# Patient Record
Sex: FEMALE | Race: WHITE | Employment: FULL TIME | URBAN - METROPOLITAN AREA
[De-identification: names, ages, dates, MRNs, and addresses within clinical notes are randomized per-mention and may not be internally consistent; named-entity substitution may affect disease eponyms.]

---

## 2017-05-11 ENCOUNTER — CARE COORDINATION (OUTPATIENT)
Dept: CASE MANAGEMENT | Facility: CLINIC | Age: 32
End: 2017-05-11

## 2018-05-16 ENCOUNTER — OFFICE VISIT (OUTPATIENT)
Dept: FAMILY MEDICINE | Facility: CLINIC | Age: 33
End: 2018-05-16
Payer: COMMERCIAL

## 2018-05-16 VITALS
TEMPERATURE: 98.4 F | HEIGHT: 62 IN | OXYGEN SATURATION: 98 % | SYSTOLIC BLOOD PRESSURE: 102 MMHG | BODY MASS INDEX: 20.24 KG/M2 | RESPIRATION RATE: 16 BRPM | WEIGHT: 110 LBS | HEART RATE: 89 BPM | DIASTOLIC BLOOD PRESSURE: 66 MMHG

## 2018-05-16 DIAGNOSIS — B07.8 COMMON WART: ICD-10-CM

## 2018-05-16 DIAGNOSIS — R21 RASH: Primary | ICD-10-CM

## 2018-05-16 DIAGNOSIS — L91.8 SKIN TAG: ICD-10-CM

## 2018-05-16 PROCEDURE — 17110 DESTRUCTION B9 LES UP TO 14: CPT | Performed by: NURSE PRACTITIONER

## 2018-05-16 PROCEDURE — 99202 OFFICE O/P NEW SF 15 MIN: CPT | Mod: 25 | Performed by: NURSE PRACTITIONER

## 2018-05-16 NOTE — PATIENT INSTRUCTIONS
Try hydrocortisone cream and clotrimazole to the rash     Follow up with derm with no improvement

## 2018-05-16 NOTE — MR AVS SNAPSHOT
After Visit Summary   5/16/2018    Cintia Randhawa    MRN: [id number]           Patient Information     Date Of Birth          1985        Visit Information        Provider Department      5/16/2018 8:30 AM Amaya Ludwig APRN CNP [facility]        Today's Diagnoses     Rash    -  1    Common wart        Skin tag          Care Instructions    Try hydrocortisone cream and clotrimazole to the rash     Follow up with derm with no improvement             Follow-ups after your visit        Additional Services     DERMATOLOGY REFERRAL       Your provider has referred you to:   FMG: [facility] [phone]   [email or web address]    FHN: [facility] [phone]   [email or web address]      Please be aware that coverage of these services is subject to the terms and limitations of your health insurance plan.  Call member services at your health plan with any benefit or coverage questions.      Please bring the following with you to your appointment:    (1) Any X-Rays, CTs or MRIs which have been performed.  Contact the facility where they were done to arrange for  prior to your scheduled appointment.    (2) List of current medications  (3) This referral request   (4) Any documents/labs given to you for this referral                  Who to contact     If you have questions or need follow up information about today's clinic visit or your schedule please contact [facility] directly at [phone].  Normal or non-critical lab and imaging results will be communicated to you by MyChart, letter or phone within 4 business days after the clinic has received the results. If you do not hear from us within 7 days, please contact the clinic through MyChart or phone. If you have a critical or abnormal lab result, we will notify you by phone as soon as possible.  Submit refill  "requests through APR or call your pharmacy and they will forward the refill request to us. Please allow 3 business days for your refill to be completed.          Additional Information About Your Visit        Diagnostic PhotonicsharInstant Opinion Information     APR lets you send messages to your doctor, view your test results, renew your prescriptions, schedule appointments and more. To sign up, go to [email or web address] . Click on \"Log in\" on the left side of the screen, which will take you to the Welcome page. Then click on \"Sign up Now\" on the right side of the page.     You will be asked to enter the access code listed below, as well as some personal information. Please follow the directions to create your username and password.     Your access code is: [email or web address]  Expires: 2018  8:54 AM     Your access code will  in 90 days. If you need help or a new code, please call your [place] clinic or [phone].        Care EveryWhere ID     This is your Care EveryWhere ID. This could be used by other organizations to access your [place] medical records  [id number]        Your Vitals Were     Pulse Temperature Respirations Height Pulse Oximetry Breastfeeding?    89 98.4  F (36.9  C) (Tympanic) 16 5' 2\" (1.575 m) 98% Yes    BMI (Body Mass Index)                   20.12 kg/m2            Blood Pressure from Last 3 Encounters:   18 102/66    Weight from Last 3 Encounters:   18 110 lb (49.9 kg)              We Performed the Following     DERMATOLOGY REFERRAL        Primary Care Provider Office Phone # Fax #    Carlie Mirza [phone]       [facility] [address]  [place] [address]        Equal Access to Services     [facility] Center[facility] : Skylar Vee, isreal garcia, minal burrowsalmalorie garcia, griselda campbell. So [place] -[phone].    ATENCIÓN: Si habla español, tiene a owen disposición servicios gratuitos de asistencia " lingüísticaAnnie Leiva al [phone].    We comply with applicable federal civil rights laws and [place] laws. We do not discriminate on the basis of race, color, national origin, age, disability, sex, sexual orientation, or gender identity.            Thank you!     Thank you for choosing [facility]  for your care. Our goal is always to provide you with excellent care. Hearing back from our patients is one way we can continue to improve our services. Please take a few minutes to complete the written survey that you may receive in the mail after your visit with us. Thank you!             Your Updated Medication List - Protect others around you: Learn how to safely use, store and throw away your medicines at www.disposemymeds.org.      Notice  As of 5/16/2018  8:54 AM    You have not been prescribed any medications.

## 2018-05-16 NOTE — PROGRESS NOTES
"HPI      SUBJECTIVE:   Cintia Randhawa is a 32 year old female who presents to clinic today for the following health issues:      Rash      Duration: 2 weeks    Description  Location: Both [place]  Itching: moderate    Intensity:  moderate    Accompanying signs and symptoms: Itching; Burning; Painful; Possible drainage; Not Spreading    History (similar episodes/previous evaluation): intermittent 1.5 month-Lasted 2 weeks; Returned 2 weeks ago    Precipitating or alleviating factors:  New exposures:  clothes detergant All natural; New Dedorant  Recent travel: no      Therapies tried and outcome: Aquaphor; Breast Milk    Rash of both axilla 2 months ago. Thought it was from a new natural deodorant she used    Used aquaphor and got it under control. Rash flared up again 2-3 weeks ago   Itches, burns, hurts   It was slightly damp the other day   Breast milk calmed it down a little last night     PMH:  Brain surgery 1 year ago. 7cm meningioma while she was 5 months pregnant. Son is now 8 months and she is breastfeeding   Neurologist in [place]   See OB/GYN Dr Rogel[facility]  Only on [facility]     Past Medical History:   Diagnosis Date     Meningioma (H) 2017    7cm when she was 5 months pregnant     History reviewed. No pertinent family history.  History reviewed. No pertinent surgical history.  Social History   Substance Use Topics     Smoking status: Never Smoker     Smokeless tobacco: Never Used     Alcohol use No     No current outpatient prescriptions on file.     Allergies   Allergen Reactions     Erythromycin      Sulfa Drugs GI Disturbance     Fever, chills aches, vommitting       Reviewed and updated as needed this visit by clinical staff and provider      ROS  Detailed as above       /66 (BP Location: Right arm, Patient Position: Chair, Cuff Size: Adult Regular)  Pulse 89  Temp 98.4  F (36.9  C) (Tympanic)  Resp 16  Ht 5' 2\" (1.575 m)  Wt 110 lb (49.9 kg)  SpO2 98%  " Breastfeeding? Yes  BMI 20.12 kg/m2    Physical Exam   Constitutional: She is well-developed, well-nourished, and in no distress.   Neurological: She is alert.   Skin: Skin is warm and dry. Rash noted.   Wart right 3rd finger and left knee   Skin tag left axilla   Rough erythematous rash about 6 cm in diameter of bilateral axilla   Psychiatric: Mood and affect normal.   Vitals reviewed.      Used liquid nitrogen to treat 2 benign appearing warts on right 3rd finger and left knee and also skin tag of left axilla after explaining risks and benefits to patient.  Three liquid nitrogen applications of 15-20 seconds each were performed on each lesion.  Pt tolerated procedure well.  There were no complications.      Assessment and Plan:       ICD-10-CM    1. Rash R21 DERMATOLOGY REFERRAL   2. Common wart B07.8 DERMATOLOGY REFERRAL     DESTRUCT BENIGN LESION, UP TO 14   3. Skin tag L91.8 DESTRUCT BENIGN LESION, UP TO 14       New rash of bilateral axilla. Unknown etiology as it looks like eczema and intertrigo. Will treat with hydrocortisone and clotrimazole for 5 days. If no improvement then f/u with derm.   Used liquid nitrogen per above for 2 warts and 1 skin tag. Gave home instructions to start OTC wart treatment in 2 days. F/u in a couple weeks without resolution       RANJANA Rodriguez, CNP  [facility]

## 2018-06-15 ENCOUNTER — OFFICE VISIT (OUTPATIENT)
Dept: FAMILY MEDICINE | Facility: CLINIC | Age: 33
End: 2018-06-15

## 2018-06-15 VITALS
OXYGEN SATURATION: 99 % | DIASTOLIC BLOOD PRESSURE: 64 MMHG | SYSTOLIC BLOOD PRESSURE: 98 MMHG | RESPIRATION RATE: 16 BRPM | BODY MASS INDEX: 19.97 KG/M2 | HEART RATE: 68 BPM | WEIGHT: 109.2 LBS

## 2018-06-15 DIAGNOSIS — Z87.898 HISTORY OF BRAIN TUMOR: ICD-10-CM

## 2018-06-15 DIAGNOSIS — Z86.011 HX OF MENINGIOMA OF THE BRAIN: ICD-10-CM

## 2018-06-15 DIAGNOSIS — Z83.49 FAMILY HISTORY OF THYROID DISEASE: ICD-10-CM

## 2018-06-15 DIAGNOSIS — Z78.9 GLUTEN FREE DIET: ICD-10-CM

## 2018-06-15 DIAGNOSIS — R53.83 OTHER FATIGUE: Primary | ICD-10-CM

## 2018-06-15 LAB
% GRANULOCYTES: 59.6 % (ref 42.2–75.2)
BACTERIA URINE: NORMAL
BILIRUB UR QL STRIP: 0
BLOOD URINE DIP: 0
CASTS/LPF: NORMAL
COLOR UR: YELLOW
CRYSTAL URINE: NORMAL
EPITHELIAL CELLS - QUEST: NORMAL
GLUCOSE UR STRIP-MCNC: 0 MG/DL
HCT VFR BLD AUTO: 41.6 % (ref 35–46)
HEMOGLOBIN: 13.9 G/DL (ref 11.8–15.5)
KETONES UR QL STRIP: 0
LEUKOCYTE ESTERASE URINE DIP: 0
LYMPHOCYTES NFR BLD AUTO: 32 % (ref 20.5–51.1)
MCH RBC QN AUTO: 29.4 PG (ref 27–31)
MCHC RBC AUTO-ENTMCNC: 33.4 G/DL (ref 33–37)
MCV RBC AUTO: 88 FL (ref 80–100)
MONOCYTES NFR BLD AUTO: 8.4 % (ref 1.7–9.3)
MUCOUS URINE: NORMAL
NITRITE UR QL STRIP: NORMAL
PH UR STRIP: 6.5 PH (ref 5–9)
PLATELET # BLD AUTO: 198 K/UL (ref 140–450)
PROT UR QL: 0 MG/DL (ref ?–0.01)
RBC # BLD AUTO: 4.73 X10/CMM (ref 3.7–5.2)
RBC URINE: NORMAL (ref 0–3)
SP GR UR STRIP: 1.02 (ref 1–1.02)
UROBILINOGEN UR QL STRIP: 0.2 EU/DL (ref 0.2–1)
WBC # BLD AUTO: 5.2 X10/CMM (ref 3.8–11)
WBC URINE: NORMAL (ref 0–3)

## 2018-06-15 PROCEDURE — 86140 C-REACTIVE PROTEIN: CPT | Mod: 90 | Performed by: FAMILY MEDICINE

## 2018-06-15 PROCEDURE — 82728 ASSAY OF FERRITIN: CPT | Mod: 90 | Performed by: FAMILY MEDICINE

## 2018-06-15 PROCEDURE — 80050 GENERAL HEALTH PANEL: CPT | Mod: 90 | Performed by: FAMILY MEDICINE

## 2018-06-15 PROCEDURE — 83540 ASSAY OF IRON: CPT | Mod: 90 | Performed by: FAMILY MEDICINE

## 2018-06-15 PROCEDURE — 85651 RBC SED RATE NONAUTOMATED: CPT | Performed by: FAMILY MEDICINE

## 2018-06-15 PROCEDURE — 99203 OFFICE O/P NEW LOW 30 MIN: CPT | Performed by: FAMILY MEDICINE

## 2018-06-15 PROCEDURE — 36415 COLL VENOUS BLD VENIPUNCTURE: CPT | Performed by: FAMILY MEDICINE

## 2018-06-15 PROCEDURE — 81003 URINALYSIS AUTO W/O SCOPE: CPT | Performed by: FAMILY MEDICINE

## 2018-06-15 PROCEDURE — 83550 IRON BINDING TEST: CPT | Mod: 90 | Performed by: FAMILY MEDICINE

## 2018-06-15 NOTE — PROGRESS NOTES
Problem(s) Oriented visit      SUBJECTIVE:                                                    Cintia Randhawa is a 32 year old female who presents to clinic today for:  Patient presents with:  Dizziness: Extremely tired, Cloudy & Foggy feeling     Pt is new to [facility] and me today. Obtained and reviewed her outside medical records during her visit today.     Has not felt up to her usual self recently. She is tired, having difficulty concentrating.   Sees Dr. Gomes in [facility].   All dx events at [place]. Was referred to Dr. Gomes from her provider at [place].   Has a hx of brain tumor (meningioma) - managed at [facility].  She has already contacted her care team there to see if she can move her MRI up from late July to anytime in the interim. Usually has MRI at [place] so can use the stronger magnet. No explicit HAs, but she is concerned her brain tumor may be back again.   Her goal for today is to see if there is anything else that could be causing her to feel foggy.   No HA, vision or hearing changes. No ST, cough, dysphagia.     Eats healthy.   Gluten free, organic  Big on protein.   Not much dairy.   No cheese.   Eats more red meat than anything.   Diet includes hamburger, steak and chicken  100% gluten free, gut acts up as if eating gluten.   Has been gluten free for years.     She exercises regularly.     Son still wakes up in the night - breastfeeding now.  Not a lot travel for work.   No int'l travel. [place]     She does not note any changes to her symptoms based on her position in space, rolling over in bed, bending down to  her child.   Not sleeping now well.  Dull achy head, feels hung over.   No meds except PNV while breastfeeding  Taking Vinegreak (s/p) to help w/ milk production.   9 month old, 3 yr old  Brain MRI moved up July 2  No return of menses afte having her baby 9 mos ago. She does not feel pregnant.     Problem list, Medication list,  Allergies, and Medical/Social/Surgical histories reviewed in [facility] and updated as appropriate.       ROS:  10 point ROS completed and negative except noted above, including Gen, HEENT, CV, Resp, GI, , MS, Neurologic, Psych    Histories:   Patient Active Problem List   Diagnosis     Hx of meningioma of the brain     Gluten free diet     Past Medical History:   Diagnosis Date     Meningioma (H) 2017    7cm when she was 5 months pregnant. Neurologist in [place]     Past Surgical History:   Procedure Laterality Date     BRAIN SURGERY  05/2017    Meningioma - L parietal. Surgery at [place].      Social History   Substance Use Topics     Smoking status: Never Smoker     Smokeless tobacco: Never Used     Alcohol use No     No family history on file.  No hx of brain tumor w/in first degree relatives.     OBJECTIVE:                                                    BP 98/64  Pulse 68  Resp 16  Wt 49.5 kg (109 lb 3.2 oz)  SpO2 99%  BMI 19.97 kg/m2  Body mass index is 19.97 kg/(m^2).   Gen: Cooperative. No acute distress. Appears very fit.   Eyes: PERRL, sclera white. No nystagmus.   Ears/Nose/Mouth/Throat: Normal pinnae and ear canals, TMs without erythema or effusion. Oropharynx mucous membranes moist, without lesions, erythema, or exudate.   Neck: Supple, without masses, lymphadenopathy or tenderness.   Heart: Regular rate and rhythm without murmurs, rubs, or gallops.   Lungs: Normal respiratory effort. Lungs are clear to auscultation. Good breath sounds bilaterally.   Abdomen: Soft, nontender, normal bowel sounds present. No obvious masses or organomegaly.  Musculoskeletal: No lower extremity edema.   Skin: Warm and well perfused.   Neurologic: Alert, oriented x3, nonfocal. CN II-XII grossly intact. Strength 5/5. Sensation intact. Not able to invoke any element of dizziness today.   Psych:  Mood and affect are appropriate.     ASSESSMENT/PLAN:                                                      Cintia was seen  today for dizziness.    Diagnoses and all orders for this visit:    Other fatigue  -     CBC with Diff/Plt ([facility])  -     Comp. Metabolic Panel (14) ([facility])  -     TSH ([facility])  -     Iron and TIBC ([facility])  -     Ferritin  Serum ([facility])  -     Cancel: Sed Rate Westergren ([facility])  -     C-Reactive Protein  Quant ([facility])  -     EBV Acute Infection Neelima ([facility])  -     Urinalysis w/reflex protein, bili ([facility])  -     Sed Rate Westergren ([facility])   Consider having baby take breast milk from a bottle for now so she can get a good night's sleep w/ continuation to breastfeed during the day.     History of brain tumor    Family history of thyroid disease    Hx of meningioma of the brain    Gluten free diet    Her exam is all normal today. Etiology of her symptoms is not clear. Will check base labs for her today. Recommend she contact her doctor in [place] to discuss further movement of her brain MRI. May also consider using MRI at [facility] w/ a known strong magnet, but unclear how this compares to the magnet in [place].     The following health maintenance items are reviewed in Epic and correct as of today:  Health Maintenance   Topic Date Due     PHQ-2 Q1 YR  11/02/1995     PAP SCREENING Q3 YR (SYSTEM ASSIGNED)  11/02/2004     INFLUENZA VACCINE (1) 09/01/2018     TETANUS IMMUNIZATION (SYSTEM ASSIGNED)  06/29/2027     HIV SCREEN (SYSTEM ASSIGNED)  Completed     >30 min spent with patient, greater than 50% spent on discussion/education/planning, etc. About The primary encounter diagnosis was Other fatigue. Diagnoses of History of brain tumor, Family history of thyroid disease, Hx of meningioma of the brain, and Gluten free diet were also pertinent to this visit.      Leslee Alfred MD  Family Medicine    For any issues, please call my office  [facility] at [phone].

## 2018-06-15 NOTE — LETTER
[facility]  [address] [place] [address]  [phone]      Renetta 15, 2018      Cintia Randhawa  [address] [facility] [address]      EMERGENCY CARE PLAN  Presenting Problem Treatment Plan   Questions or concerns during clinic hours I will call the clinic directly:    [facility]  [address] [place] [address]  [phone]   Questions or concerns outside clinic hours I will call the after-hours on-call line at [phone]   Patient needs to schedule an appointment I will call the scheduling team during business hours at [phone]   Same day treatment  I will call the clinic first, then urgent care and express care if needed   Clinic Care Coordinators LAURA Marcelo:  [phone]  Jagruti Parisi RN: [phone]   Crisis Services:  Behavioral or Mental Health [facility] ([facility])   [phone]   Emergency treatment--Immediately CALL 701

## 2018-06-15 NOTE — MR AVS SNAPSHOT
"              After Visit Summary   6/15/2018    Cintia Randhawa    MRN: [id number]           Patient Information     Date Of Birth          1985        Visit Information        Provider Department      6/15/2018 2:30 PM Leslee Alfred MD [facility]        Today's Diagnoses     Other fatigue    -  1    History of brain tumor        Family history of thyroid disease           Follow-ups after your visit        Who to contact     If you have questions or need follow up information about today's clinic visit or your schedule please contact [facility] directly at [phone].  Normal or non-critical lab and imaging results will be communicated to you by Water Science Technologieshart, letter or phone within 4 business days after the clinic has received the results. If you do not hear from us within 7 days, please contact the clinic through Water Science [employer]hart or phone. If you have a critical or abnormal lab result, we will notify you by phone as soon as possible.  Submit refill requests through [email or web address] or call your pharmacy and they will forward the refill request to us. Please allow 3 business days for your refill to be completed.          Additional Information About Your Visit        MyChart Information     [email or web address] lets you send messages to your doctor, view your test results, renew your prescriptions, schedule appointments and more. To sign up, go to [email or web address] . Click on \"Log in\" on the left side of the screen, which will take you to the Welcome page. Then click on \"Sign up Now\" on the right side of the page.     You will be asked to enter the access code listed below, as well as some personal information. Please follow the directions to create your username and password.     Your access code is: [email or web address]  Expires: 2018  8:54 AM     Your access code will  in 90 days. If you need help or a new code, please call your [place] clinic or [phone].        Care EveryWhere ID  "    This is your Care EveryWhere ID. This could be used by other organizations to access your [place] medical records  [id number]        Your Vitals Were     Pulse Respirations Pulse Oximetry BMI (Body Mass Index)          68 16 99% 19.97 kg/m2         Blood Pressure from Last 3 Encounters:   06/15/18 98/64   05/16/18 102/66    Weight from Last 3 Encounters:   06/15/18 49.5 kg (109 lb 3.2 oz)   05/16/18 49.9 kg (110 lb)              We Performed the Following     C-Reactive Protein  Quant ([facility])     CBC with Diff/Plt ([facility])     Comp. Metabolic Panel (14) ([facility])     EBV Acute Infection Neelima ([facility])     Ferritin  Serum ([facility])     Iron and TIBC ([facility])     Sed Rate Westergren ([facility])     TSH ([facility])     Urinalysis w/reflex protein, bili ([facility])        Primary Care Provider Office Phone # Fax #    Leslee Soraida Alfred [phone] [address] [facility] [address]        Equal Access to Services     [facility]IA : Hadii aad ku hadasho Soomaali, waaxda luqadaha, qaybta kaalmada adequinitnyalorie, griselda tilley . So [facility] [phone].    ATENCIÓN: Si habla español, tiene a owen disposición servicios gratuitos de asistencia lingüística. Llame al [phone].    We comply with applicable federal civil rights laws and [place] laws. We do not discriminate on the basis of race, color, national origin, age, disability, sex, sexual orientation, or gender identity.            Thank you!     Thank you for choosing [facility]  for your care. Our goal is always to provide you with excellent care. Hearing back from our patients is one way we can continue to improve our services. Please take a few minutes to complete the written survey that you may receive in the mail after your visit with us. Thank you!             Your Updated Medication List - Protect others around you: Learn how to safely use, store and throw away your medicines at www.disposemymeds.org.       Notice  As of 6/15/2018  3:40 PM    You have not been prescribed any medications.

## 2018-06-15 NOTE — LETTER
"[facility]  [address] [place]  [address]  Phone: [phone]    June 18, 2018      Cintia Randhawa  [address] [facility] [address]              Dear Cintia,    Your labs show your blood glucose was very low when you had your labs drawn. I also believe this was a non-fasting lab, which is even more concerning to me. Please try to liberalize your diet a little and add some more fruits or glucose/sugar to your diet. The next time you feel dizzy, light-headed or unexpectedly sweaty or tired, please eat or drink something containing sugar because these are all signs of very low blood glucose. If this does not make the problem go away, please see me in clinic because there is a second round of labs we could run to investigate why your blood sugar goes low.     Your sodium was slightly elevated. I suspect it might have been due to slight dehydration on the day of your visit. It is only minimally elevated which does not usually indicate a problem. We should simply recheck this the next time you have your glucose checked to make sure it is not indicating a trend.     As I explained in the voice mail I left for you Sunday, your Uri Vanegas panel shows exposure to the virus, but no active infection. These results are very, very common as most adults have been exposed to Uri Vanegas in their lifetime. Your labs show you do NOT have an active EB infection and no treatment is indicated for past exposure to Uri Vanegas.     The rest of your labs look very good.     I hope adding some more glucose to your diet is helpful. If not, please see in clinic in a few days and we'll consider other options        Sincerely,     Leslee \"Sana\" MD Tima    Results for orders placed or performed in visit on 06/15/18   CBC with Diff/Plt ([facility])   Result Value Ref Range    WBC x10/cmm 5.2 3.8 - 11.0 x10/cmm    % Lymphocytes 32.0 20.5 - 51.1 %    % Monocytes 8.4 1.7 - 9.3 %    % Granulocytes 59.6 42.2 " - 75.2 %    RBC x10/cmm 4.73 3.7 - 5.2 x10/cmm    Hemoglobin 13.9 11.8 - 15.5 g/dl    Hematocrit 41.6 35 - 46 %    MCV 88.0 80 - 100 fL    MCH 29.4 27.0 - 31.0 pg    MCHC 33.4 33.0 - 37.0 g/dL    Platelet Count 198 140 - 450 K/uL   Comp. Metabolic Panel (14) ([facility])   Result Value Ref Range    Glucose 58 (L) 65 - 99 mg/dL    Urea Nitrogen 19 6 - 20 mg/dL    Creatinine 0.83 0.57 - 1.00 mg/dL    eGFR If NonAfricn Am 92 >59 mL/min/1.73    eGFR If Africn Am 106 >59 mL/min/1.73    BUN/Creatinine Ratio 23 9 - 23    Sodium 145 (H) 134 - 144 mmol/L    Potassium 4.5 3.5 - 5.2 mmol/L    Chloride 105 96 - 106 mmol/L    Total CO2 25 20 - 29 mmol/L    Calcium 9.9 8.7 - 10.2 mg/dL    Protein Total 7.3 6.0 - 8.5 g/dL    Albumin 5.1 3.5 - 5.5 g/dL    Globulin, Total 2.2 1.5 - 4.5 g/dL    A/G Ratio 2.3 (H) 1.2 - 2.2    Bilirubin Total 0.2 0.0 - 1.2 mg/dL    Alkaline Phosphatase 90 39 - 117 IU/L    AST 15 0 - 40 IU/L    ALT 18 0 - 32 IU/L    Narrative    Performed at:  01 - [facility] [address], [place], [place]  [id number]  : Chava Segura MD, Phone:  [phone]   TSH ([facility])   Result Value Ref Range    TSH 1.890 0.450 - 4.500 uIU/mL    Narrative    Performed at:  01 - [facility] [address], [place], [place]  [id number]  : Chava Segrua MD, Phone:  [phone]   Iron and TIBC ([facility])   Result Value Ref Range    Iron Binding Cap 251 250 - 450 ug/dL    UIBC 185 131 - 425 ug/dL    Iron 66 27 - 159 ug/dL    Iron Saturation 26 15 - 55 %    Narrative    Performed at:  01 - [facility] [address], [place], [place]  [id number]  : Chava Segura MD, Phone:  [phone]   Ferritin  Serum ([facility])   Result Value Ref Range    Ferritin 64 15 - 150 ng/mL    Narrative    Performed at:  01 - [facility] [address], [place], [place]  [id number]  : Chava Segura MD, Phone:  [phone]   C-Reactive Protein  Quant ([facility])   Result Value Ref Range    C-Reactive Protein  <0.3 0.0 - 4.9 mg/L    Narrative    Performed at:  01 - [facility] [address], [place], [place]  [id number]  : Chava Segura MD, Phone:  [phone]   EBV Acute Infection Neelima ([facility])   Result Value Ref Range    EBV Neelima VCA IgM <36.0 0.0 - 35.9 U/mL    EBV Early Antigen Neelima IGG <9.0 0.0 - 8.9 U/mL    EBV Neelima VCA .0 (H) 0.0 - 17.9 U/mL    EBV Nuclear Antigen Neelima .0 (H) 0.0 - 17.9 U/mL    Interpretation Comment     Narrative    Performed at:  02 - [address]  [id number]  : Fabrizio Rodriguez MD, Phone:  [phone]   Urinalysis w/reflex protein, bili ([facility])   Result Value Ref Range    Color Urine Yellow     pH Urine 6.5 5 - 9 pH    Specific Gravity Urine 1.020 1.005 - 1.025    Protein Urine 0 0.01 mg/dL    Glucose Urine 0     Ketones Urine 0     Leukocyte Esterase Urine 0     Blood Urine 0     Nitrite Urine Neg NEG    Bilirubin Urine Dip 0     Urobilinogen Urine 0.2 0.2 - 1.0 EU/dL    WBC Urine  0 - 3    RBC Urine  0 - 3    Epithelial Cells      Crystal Urine      Bacteria Urine      Mucous Urine      Casts/LPF     Sed Rate Westergren ([facility])   Result Value Ref Range    Sed Rate 2 0 - 32 mm/hr    Narrative    Performed at:  01 - [facility] [address], [place], [place]  [id number]  : Chava Segura MD, Phone:  [phone]

## 2018-06-16 ENCOUNTER — HEALTH MAINTENANCE LETTER (OUTPATIENT)
Age: 33
End: 2018-06-16

## 2018-06-16 LAB
CRP SERPL-MCNC: <0.3 MG/L (ref 0–4.9)
ERYTHROCYTE [SEDIMENTATION RATE] IN BLOOD: 2 MM/HR (ref 0–32)
FERRITIN SERPL-MCNC: 64 NG/ML (ref 15–150)
IRON BINDING CAP: 251 UG/DL (ref 250–450)
IRON SATURATION: 26 % (ref 15–55)
IRON: 66 UG/DL (ref 27–159)
UIBC: 185 UG/DL (ref 131–425)

## 2018-06-17 ENCOUNTER — TELEPHONE (OUTPATIENT)
Dept: FAMILY MEDICINE | Facility: CLINIC | Age: 33
End: 2018-06-17

## 2018-06-17 LAB
ALBUMIN SERPL-MCNC: 5.1 G/DL (ref 3.5–5.5)
ALBUMIN/GLOB SERPL: 2.3 {RATIO} (ref 1.2–2.2)
ALP SERPL-CCNC: 90 IU/L (ref 39–117)
ALT SERPL-CCNC: 18 IU/L (ref 0–32)
AST SERPL-CCNC: 15 IU/L (ref 0–40)
BILIRUB SERPL-MCNC: 0.2 MG/DL (ref 0–1.2)
BUN SERPL-MCNC: 19 MG/DL (ref 6–20)
BUN/CREATININE RATIO: 23 (ref 9–23)
CALCIUM SERPL-MCNC: 9.9 MG/DL (ref 8.7–10.2)
CHLORIDE SERPLBLD-SCNC: 105 MMOL/L (ref 96–106)
CREAT SERPL-MCNC: 0.83 MG/DL (ref 0.57–1)
EBV ABY VCA IGG: 356 U/ML (ref 0–17.9)
EBV ABY VCA IGM: <36 U/ML (ref 0–35.9)
EGFR IF AFRICN AM: 106 ML/MIN/1.73
EGFR IF NONAFRICN AM: 92 ML/MIN/1.73
GLOBULIN, TOTAL: 2.2 G/DL (ref 1.5–4.5)
GLUCOSE SERPL-MCNC: 58 MG/DL (ref 65–99)
INTERPRETATION: ABNORMAL
Lab: 181 U/ML (ref 0–17.9)
Lab: <9 U/ML (ref 0–8.9)
POTASSIUM SERPL-SCNC: 4.5 MMOL/L (ref 3.5–5.2)
PROT SERPL-MCNC: 7.3 G/DL (ref 6–8.5)
SODIUM SERPL-SCNC: 145 MMOL/L (ref 134–144)
TOTAL CO2: 25 MMOL/L (ref 20–29)
TSH BLD-ACNC: 1.89 UIU/ML (ref 0.45–4.5)

## 2018-06-17 NOTE — TELEPHONE ENCOUNTER
I called pt w/ results of her labs. No answer. Left detailed message.     Glucose is very low in the 50s. Explained this is lower than we even consider it as a low w/ patients who have diabetes. This was also a non-fasting lab. I expect this would account for many of her acute symptoms. Encouraged liberalizing her diet.     EBB panel negative for acute infection. Explained that most adults in the [place] have a positive IGG because exposure is so common.     Sodium slightly high - possible due to dehydration state.     Asked for an update from her Neurosurgeon about plans to move her MRI up.

## 2018-06-19 NOTE — TELEPHONE ENCOUNTER
Called and spoke with patient on 6/18/18 regarding low fasting blood sugar.  Discussed with patient that she is breastfeeding and feels like she is snacking and eating all the time.  Advised patient that breastfeeding can burn 500 calories/day and potentially she is not eating enough.  Discussed this with Dr Benson who agrees that patient likely is not consuming enough calories per day.  Message left for patient 6/18/18 ~6pm and requested patient return phone call to discuss results further.  Aleyda Ortiz

## 2018-07-15 PROBLEM — Z86.011 HX OF MENINGIOMA OF THE BRAIN: Status: ACTIVE | Noted: 2018-07-15

## 2018-07-15 PROBLEM — Z78.9 GLUTEN FREE DIET: Status: ACTIVE | Noted: 2018-06-15

## 2018-08-30 ENCOUNTER — OFFICE VISIT (OUTPATIENT)
Dept: FAMILY MEDICINE | Facility: CLINIC | Age: 33
End: 2018-08-30

## 2018-08-30 VITALS
BODY MASS INDEX: 19.02 KG/M2 | HEART RATE: 70 BPM | DIASTOLIC BLOOD PRESSURE: 64 MMHG | SYSTOLIC BLOOD PRESSURE: 102 MMHG | WEIGHT: 104 LBS | RESPIRATION RATE: 16 BRPM | OXYGEN SATURATION: 99 %

## 2018-08-30 DIAGNOSIS — T78.40XA ALLERGIC REACTION, INITIAL ENCOUNTER: Primary | ICD-10-CM

## 2018-08-30 DIAGNOSIS — Z86.011 HX OF BENIGN NEOPLASM OF BRAIN: ICD-10-CM

## 2018-08-30 PROCEDURE — 99214 OFFICE O/P EST MOD 30 MIN: CPT | Performed by: FAMILY MEDICINE

## 2018-08-30 RX ORDER — PREDNISONE 20 MG/1
TABLET ORAL
Qty: 9 TABLET | Refills: 0 | Status: SHIPPED | OUTPATIENT
Start: 2018-08-30 | End: 2020-01-14

## 2018-08-30 RX ORDER — GENTAMICIN SULFATE 3 MG/ML
SOLUTION/ DROPS OPHTHALMIC
Refills: 0 | COMMUNITY
Start: 2018-07-19 | End: 2020-01-14

## 2018-08-30 RX ORDER — LEVETIRACETAM 500 MG/1
TABLET ORAL
COMMUNITY
Start: 2018-08-09

## 2018-08-30 NOTE — PROGRESS NOTES
Problem(s) Oriented visit      SUBJECTIVE:                                                    Cintia Randhawa is a 32 year old female who presents to clinic today for:  Patient presents with:  Allergic Reaction: lips, start  burning, itchy painful    Pt started using a new Chapstick a few days ago for dry lips; not unlike other days she has used the same product. Applied it several times. Then started to have itching and burning limited to B lips - both upper and lower. Now edematous and sore. Not getting worse since stopped using Chapstick, but not getting any better. No tongue edema. No lesions in her mouth. Has tried using Aquaphor, topical OTC steroid cream, Neosporin. Most products just burn. Pain is moderate. Has not had this before. Not on any meds assoc w/ angioedema. No throat closing. No HA. No edema elsewhere in her body. Voice is OK.     Since I last saw her, she is trying to eat more regularly due to marked hypoglycemia on labs drawn the last time I saw her. She is breastfeeding. Wt is still down 5 lbs since I saw her in .     She is being followed by MD in [place] for her hx of brain tumor. Stable.     Problem list, Medication list, Allergies, and Medical/Social/Surgical histories reviewed in [facility] and updated as appropriate.     ROS:  10 point ROS completed and negative except noted above, including Gen, HEENT, CV, Resp, GI, , MS, Neurologic, Psych    Histories:   Patient Active Problem List   Diagnosis     Hx of meningioma of the brain     Gluten free diet     Brain mass      delivery delivered     Meningioma determined by biopsy of brain (H)     Previous  delivery affecting pregnancy     Past Medical History:   Diagnosis Date     Meningioma (H)     7cm when she was 5 months pregnant. Neurologist in [place]     Past Surgical History:   Procedure Laterality Date     BRAIN SURGERY  2017    Meningioma - L parietal. Surgery at [place].      Social History    Substance Use Topics     Smoking status: Never Smoker     Smokeless tobacco: Never Used     Alcohol use No     No family history on file.    OBJECTIVE:                                                    /64  Pulse 70  Resp 16  Wt 47.2 kg (104 lb)  SpO2 99%  BMI 19.02 kg/m2  Body mass index is 19.02 kg/(m^2).   Gen: Cooperative. No acute distress. Appears very fit.   Eyes: PERRL, sclera white.   Ears/Nose/Mouth/Throat: Oropharynx mucous membranes moist, without lesions. Her lips are abn erythematous and edematous w/ cracked skin; no bleeding. Erythema extends past the vermilion border. No canker sores present; no fever blisters present. Speech is normal except for some letters involving more involvement w/ her lips. Has small cracks at the corners of her mouth. It appears painful to talk.   Neck: Supple, without masses, lymphadenopathy or tenderness.   Heart: Regular rate and rhythm without murmurs, rubs, or gallops.   Lungs: Normal respiratory effort. Is able to speak in full sentences.     Skin: Warm and well perfused.   Neurologic: Alert, nonfocal.  Psych:  Mood and affect are anxious, but not out of line w/ discussion at hand.     ASSESSMENT/PLAN:                                                      Cintia was seen today for allergic reaction.    Diagnoses and all orders for this visit:    Allergic reaction, initial encounter  -     predniSONE (DELTASONE) 20 MG tablet; Take 2 pills by mouth with food x 3 mornings, then 1 pill by mouth x 3 mornings for lip swelling.   Stop using Neosporin.    OK to continue to use Aquaphor topically.    Stop taking prednisone as soon as edema resolves.    If becomes worse, she is to call 911 for airway mgmt.     Breast feeding status of mother   Advised to pump and dump - some steroid may pass into breast milk and baby may become irritable.    She has enough milk stockpiled she thinks she can do this.     Hx of benign neoplasm of brain  She is interested in non-rx  options to manage stress. Has looked into may options. Has not yet considered acupuncture. She is very interested in this. I provided info on [place] Comm Acupuncture.     >25 min spent with patient, greater than 50% spent on discussion/education/planning, etc. About The primary encounter diagnosis was Allergic reaction, initial encounter. Diagnoses of Hx of benign neoplasm of brain and Breast feeding status of mother were also pertinent to this visit.    The following health maintenance items are reviewed in Epic and correct as of today:  Health Maintenance   Topic Date Due     PHQ-2 Q1 YR  11/02/1995     PAP SCREENING Q3 YR (SYSTEM ASSIGNED)  11/02/2004     INFLUENZA VACCINE (1) 09/01/2018     TETANUS IMMUNIZATION (SYSTEM ASSIGNED)  06/29/2027     HIV SCREEN (SYSTEM ASSIGNED)  Completed       Leslee Alfred MD  Family Medicine    For any issues, please call my office  [facility] at [phone].

## 2018-08-30 NOTE — MR AVS SNAPSHOT
"              After Visit Summary   2018    Cintia aRndhawa    MRN: [id number]           Patient Information     Date Of Birth          1985        Visit Information        Provider Department      2018 10:15 AM Leslee Alfred MD [facility]        Today's Diagnoses     Allergic reaction, initial encounter    -  1    Hx of benign neoplasm of brain           Follow-ups after your visit        Who to contact     If you have questions or need follow up information about today's clinic visit or your schedule please contact [facility] directly at [phone].  Normal or non-critical lab and imaging results will be communicated to you by [employer], letter or phone within 4 business days after the clinic has received the results. If you do not hear from us within 7 days, please contact the clinic through [employer] or phone. If you have a critical or abnormal lab result, we will notify you by phone as soon as possible.  Submit refill requests through [employer] or call your pharmacy and they will forward the refill request to us. Please allow 3 business days for your refill to be completed.          Additional Information About Your Visit        MyChart Information     [employer] lets you send messages to your doctor, view your test results, renew your prescriptions, schedule appointments and more. To sign up, go to [email or web address] . Click on \"Log in\" on the left side of the screen, which will take you to the Welcome page. Then click on \"Sign up Now\" on the right side of the page.     You will be asked to enter the access code listed below, as well as some personal information. Please follow the directions to create your username and password.     Your access code is: [email or web address]  Expires: 2018 10:57 AM     Your access code will  in 90 days. If you need help or a new code, please call your [place] clinic or [phone].        Care EveryWhere ID     This " is your Care EveryWhere ID. This could be used by other organizations to access your [place] medical records  [id number]        Your Vitals Were     Pulse Respirations Pulse Oximetry BMI (Body Mass Index)          70 16 99% 19.02 kg/m2         Blood Pressure from Last 3 Encounters:   08/30/18 102/64   06/15/18 98/64   05/16/18 102/66    Weight from Last 3 Encounters:   08/30/18 47.2 kg (104 lb)   06/15/18 49.5 kg (109 lb 3.2 oz)   05/16/18 49.9 kg (110 lb)              Today, you had the following     No orders found for display         Today's Medication Changes          These changes are accurate as of 8/30/18 10:57 AM.  If you have any questions, ask your nurse or doctor.               Start taking these medicines.        Dose/Directions    predniSONE 20 MG tablet   Commonly known as:  DELTASONE   Used for:  Allergic reaction, initial encounter   Started by:  Leslee Alfred MD        Take 2 pills by mouth with food x 3 mornings, then 1 pill by mouth x 3 mornings for lip swelling.   Quantity:  9 tablet   Refills:  0            Where to get your medicines      These medications were sent to [id number] - [place], [place] - [address]  [address] [place] [address]     Phone:  [phone]     predniSONE 20 MG tablet                Primary Care Provider Office Phone # Fax #    Leslee Alfred [phone] [address]  [facility] [address]        Equal Access to Services     [facility] AH: Hadii aad ku hadasho Soomaali, waaxda luqadaha, qaybta kaalmada adeegyada, waxalfonzo fabrizio tilley . So [facility] [phone].    ATENCIÓN: Si habla español, tiene a owen disposición servicios gratuitos de asistencia lingüística. Llame al [phone].    We comply with applicable federal civil rights laws and [place] laws. We do not discriminate on the basis of race, color, national origin, age, disability, sex, sexual orientation, or gender identity.             Thank you!     Thank you for choosing [facility]  for your care. Our goal is always to provide you with excellent care. Hearing back from our patients is one way we can continue to improve our services. Please take a few minutes to complete the written survey that you may receive in the mail after your visit with us. Thank you!             Your Updated Medication List - Protect others around you: Learn how to safely use, store and throw away your medicines at www.disposemymeds.org.          This list is accurate as of 8/30/18 10:57 AM.  Always use your most recent med list.                   Brand Name Dispense Instructions for use Diagnosis    gentamicin 0.3 % ophthalmic solution    GARAMYCIN          levETIRAcetam 500 MG tablet    KEPPRA     Take 2 tabs twice daily        predniSONE 20 MG tablet    DELTASONE    9 tablet    Take 2 pills by mouth with food x 3 mornings, then 1 pill by mouth x 3 mornings for lip swelling.    Allergic reaction, initial encounter

## 2018-09-04 PROBLEM — D32.0: Status: ACTIVE | Noted: 2017-05-23

## 2018-09-04 PROBLEM — O34.219 PREVIOUS CESAREAN DELIVERY AFFECTING PREGNANCY: Status: ACTIVE | Noted: 2017-05-23

## 2018-09-04 PROBLEM — G93.89 BRAIN MASS: Status: ACTIVE | Noted: 2017-05-07

## 2019-02-22 ENCOUNTER — OFFICE VISIT (OUTPATIENT)
Dept: FAMILY MEDICINE | Facility: CLINIC | Age: 34
End: 2019-02-22
Payer: COMMERCIAL

## 2019-02-22 VITALS
WEIGHT: 110 LBS | HEART RATE: 69 BPM | OXYGEN SATURATION: 100 % | TEMPERATURE: 98.5 F | SYSTOLIC BLOOD PRESSURE: 91 MMHG | BODY MASS INDEX: 20.12 KG/M2 | DIASTOLIC BLOOD PRESSURE: 58 MMHG

## 2019-02-22 DIAGNOSIS — A09 TRAVELER'S DIARRHEA: Primary | ICD-10-CM

## 2019-02-22 PROCEDURE — 99214 OFFICE O/P EST MOD 30 MIN: CPT | Performed by: FAMILY MEDICINE

## 2019-02-22 NOTE — NURSING NOTE
"Chief Complaint   Patient presents with     Gastrointestinal Problem     came back from [place] 12/15/18     initial BP 91/58 (BP Location: Right arm, Cuff Size: Adult Regular)   Pulse 69   Temp 98.5  F (36.9  C) (Oral)   Wt 49.9 kg (110 lb)   SpO2 100%   BMI 20.12 kg/m   Estimated body mass index is 20.12 kg/m  as calculated from the following:    Height as of 5/16/18: 1.575 m (5' 2\").    Weight as of this encounter: 49.9 kg (110 lb).  BP completed using cuff size: regular.   R arm      Health Maintenance that is potentially due pending provider review:  NONE    n/a    Daniele Sanchez ma  "

## 2019-02-22 NOTE — PROGRESS NOTES
"    Nursing Notes:   Daniele Sanchez, [facility]  2/22/2019  2:03 PM  Signed  Chief Complaint   Patient presents with     Gastrointestinal Problem     came back from [place] 12/15/18     initial BP 91/58 (BP Location: Right arm, Cuff Size: Adult Regular)   Pulse 69   Temp 98.5  F (36.9  C) (Oral)   Wt 49.9 kg (110 lb)   SpO2 100%   BMI 20.12 kg/m    Estimated body mass index is 20.12 kg/m  as calculated from the following:    Height as of 5/16/18: 1.575 m (5' 2\").    Weight as of this encounter: 49.9 kg (110 lb).  BP completed using cuff size: regular.   R arm      Health Maintenance that is potentially due pending provider review:  NONE    n/a    Daniele Sanchez ma  Nursing note reviewed with patient.  Accurracy and completeness verified.    Chief Complaint   Patient presents with     Gastrointestinal Problem     came back from [place] 12/15/18     HPI:    Has dome some dietary changes since [place]  Did not get sick while in [place], though maybe last day or two started to feel off... Was there 9-10 days  [place], drank bottled water but did leave resort to go to restaurant  One other woman got flu like symptoms  But since then gas, bloating, BM  Cant seem to eat anything without it bothering her  Trying to do fodmap diet, it seemed to help a little  Not feeling full all the time, every two hours feels hungry      Health Maintenance   Topic Date Due     PREVENTIVE CARE VISIT  1985     PHQ-2 Q1 YR  11/02/1995     PAP SCREENING Q3 YR (SYSTEM ASSIGNED)  11/02/2004     INFLUENZA VACCINE (1) 09/01/2018     DTAP/TDAP/TD IMMUNIZATION (10 - Td) 06/29/2027     ZOSTER IMMUNIZATION (1 of 2) 11/02/2033     HIV SCREEN (SYSTEM ASSIGNED)  Completed     IPV IMMUNIZATION  Completed     MENINGITIS IMMUNIZATION  Completed       ROS: As per HPI.  Constitutional: no recent illness, no fevers/sweats/chills  GI: no nausea/vomiting/diarrhea, no black or bloody stools    I have reviewed and updated the patient's " past medical history in the EMR. Current problems are:    Patient Active Problem List   Diagnosis     Hx of meningioma of the brain     Gluten free diet     Brain mass      delivery delivered     Meningioma determined by biopsy of brain (H)     Previous  delivery affecting pregnancy     Past Surgical History:   Procedure Laterality Date     BRAIN SURGERY  2017    Meningioma - L parietal. Surgery at [place].        Social History     Tobacco Use     Smoking status: Never Smoker     Smokeless tobacco: Never Used   Substance Use Topics     Alcohol use: No     No family history on file.      Allergies, reviewed:     Allergies   Allergen Reactions     Erythromycin      Sulfa Drugs GI Disturbance     Fever, chills aches, vommitting       Current Outpatient Medications   Medication Sig Dispense Refill     gentamicin (GARAMYCIN) 0.3 % ophthalmic solution   0     levETIRAcetam (KEPPRA) 500 MG tablet Take 2 tabs twice daily       predniSONE (DELTASONE) 20 MG tablet Take 2 pills by mouth with food x 3 mornings, then 1 pill by mouth x 3 mornings for lip swelling. (Patient not taking: Reported on 2019.) 9 tablet 0       Objective:  BP 91/58 (BP Location: Right arm, Cuff Size: Adult Regular)   Pulse 69   Temp 98.5  F (36.9  C) (Oral)   Wt 49.9 kg (110 lb)   SpO2 100%   BMI 20.12 kg/m    General Appearance: Pleasant, alert, WN/WD in no acute respiratory distress.  Skin: no rash, warm and dry.  Neurologic Exam: Nonfocal, no tremor. Normal gait.  Psychiatric Exam: Alert - appropriate, normal affect    ASSESSMENT/PLAN:    ICD-10-CM    1. Traveler's diarrhea A09 Giardia antigen     Enteric Bacteria and Virus Panel by ILA Stool     Ova and Parasite Exam Routine     Post travel diarrhea with concerning symptoms/prolonged course.  Ddx inc protists such as giardia, amoebas, enteric bacterial infections, post travel irritable bowel   Start workup today, Will Follow up pending lab tests with patient by [facility]  or phone  Treatment with antibiotics and probiotics based on the pathogen (s) found         Aric Ortiz MD MPH    No Follow-up on file.

## 2019-02-26 DIAGNOSIS — A09 TRAVELER'S DIARRHEA: ICD-10-CM

## 2019-02-26 PROCEDURE — 87209 SMEAR COMPLEX STAIN: CPT | Performed by: FAMILY MEDICINE

## 2019-02-26 PROCEDURE — 87329 GIARDIA AG IA: CPT | Performed by: FAMILY MEDICINE

## 2019-02-26 PROCEDURE — 87177 OVA AND PARASITES SMEARS: CPT | Performed by: FAMILY MEDICINE

## 2019-02-26 PROCEDURE — 87506 IADNA-DNA/RNA PROBE TQ 6-11: CPT | Performed by: FAMILY MEDICINE

## 2019-02-27 LAB
G LAMBLIA AG STL QL IA: NORMAL
O+P STL MICRO: NORMAL
O+P STL MICRO: NORMAL
SPECIMEN SOURCE: NORMAL
SPECIMEN SOURCE: NORMAL

## 2020-01-14 ENCOUNTER — OFFICE VISIT (OUTPATIENT)
Dept: FAMILY MEDICINE | Facility: CLINIC | Age: 35
End: 2020-01-14

## 2020-01-14 VITALS
BODY MASS INDEX: 19.28 KG/M2 | RESPIRATION RATE: 16 BRPM | SYSTOLIC BLOOD PRESSURE: 96 MMHG | OXYGEN SATURATION: 99 % | WEIGHT: 108.8 LBS | HEART RATE: 64 BPM | DIASTOLIC BLOOD PRESSURE: 64 MMHG | HEIGHT: 63 IN

## 2020-01-14 DIAGNOSIS — K76.9 LIVER LESION: Primary | ICD-10-CM

## 2020-01-14 DIAGNOSIS — R10.31 RLQ ABDOMINAL PAIN: ICD-10-CM

## 2020-01-14 PROCEDURE — 99214 OFFICE O/P EST MOD 30 MIN: CPT | Performed by: FAMILY MEDICINE

## 2020-01-14 ASSESSMENT — MIFFLIN-ST. JEOR: SCORE: 1144.7

## 2020-01-14 NOTE — PROGRESS NOTES
Problem(s) Oriented visit        SUBJECTIVE:                                                    Cintia Randhawa is a 34 year old female who presents to clinic today for ongoing issues of RLQ abdominal pain. She notes that she was awakened with pain while she was on vacation in [place]. She said the pain was severe and lasted for hours prompting her to be seen. The pain occurred on day 4 of her menstrual cycle, with a 28 day regular cycle typically. CT of the abdomen and pelvis (see report) showed fluid in the bowel, large amount of stool, and liver lesions though to be hemangiomas, largest 3.9 cm. Normal WBC count, normal liver functions, negative pregnancy test. She now has recurring bouts of shorter lasting milder RLQ pain that come and go and last about 10 minutes at a time. After her trip she tried to clean out her bowels with an enema and some Miralax. She thought that gave good effect.     Incidentally, she had surgery for removal of a second meningioma 2019, doing well in her recovery without any deficits.    Problem list, Medication list, Allergies, and Medical/Social/Surgical histories reviewed in [facility] and updated as appropriate.   Additional history: as documented    ROS:  5 point ROS completed and negative except noted above, including Gen, CV, Resp, GI, MS      Histories:   Patient Active Problem List   Diagnosis     Hx of meningioma of the brain     Gluten free diet     Brain mass      delivery delivered     Meningioma determined by biopsy of brain (H)     Previous  delivery affecting pregnancy     Past Surgical History:   Procedure Laterality Date     BRAIN SURGERY  2017    Meningioma - L parietal. Surgery at [facility].      BRAIN SURGERY  2019    meningioma      SECTION  2017       Social History     Tobacco Use     Smoking status: Never Smoker     Smokeless tobacco: Never Used   Substance Use Topics     Alcohol use: No     No family history on file.   "      OBJECTIVE:                                                    BP 96/64   Pulse 64   Resp 16   Ht 1.588 m (5' 2.5\")   Wt 49.4 kg (108 lb 12.8 oz)   SpO2 99%   BMI 19.58 kg/m    Body mass index is 19.58 kg/m .   GENERAL APPEARANCE: Alert, no acute distress  EYES: PERRL, EOM normal, conjunctiva and lids normal  NECK: No adenopathy,masses or thyromegaly  RESP: lungs clear to auscultation   CV: normal rate, regular rhythm, no murmur or gallop  ABDOMEN: soft, no organomegaly or masses, normal bowel sounds, moderate stool palpated throughout, tenderness to palpation in the RLQ but without rebound or guarding.   MS: extremities normal, no peripheral edema  NEURO: Alert, oriented, speech and mentation normal  PSYCH: mentation appears normal, affect and mood normal   Labs Resulted Today: No results found for any visits on 01/14/20.  ASSESSMENT/PLAN:                                                        Cintia was seen today for results.    Diagnoses and all orders for this visit:    Liver lesion  -     [facility]  Liver MRI is ordered to verify that this is indeed a hemangioma and to further assess size. Follow up pending results.    RLQ abdominal pain  Etiology could include ovarian cyst with rupture, less likely functional cyst given the timing of it. She has an appointment in 2 days with her GYN and she anticipates she will have pelvic U/S done there. Offered her official pelvic U/S at [facility] imaging, but she declines for now. Other etiology could include ongoing issues with constipation. She will hydrate well, use fiber supplement, avoid constipating foods, and consider another enema or suppository to help with additional cleanout. No hematuria makes nephrolithiasis less likely, especially as no findings for this on abdominal CT.    >25 min spent with patient, greater than 50% spent on discussion/education/planning, etc. About The primary encounter diagnosis was Liver lesion. A diagnosis " of RLQ abdominal pain was also pertinent to this visit.      There are no Patient Instructions on file for this visit.    The following health maintenance items are reviewed in Epic and correct as of today:  Health Maintenance   Topic Date Due     PREVENTIVE CARE VISIT  1983     HPV  11/02/2004     PAP  11/02/2008     INFLUENZA VACCINE (1) 09/01/2019     PHQ-2  01/01/2020     DTAP/TDAP/TD IMMUNIZATION (10 - Td) 06/29/2027     HIV SCREENING  Completed     IPV IMMUNIZATION  Completed     MENINGITIS IMMUNIZATION  Completed       Talya Keller MD  [facility]  Family Practice  [facility]  [phone]    For any issues my office # is [phone]

## 2020-01-24 ENCOUNTER — TELEPHONE (OUTPATIENT)
Dept: FAMILY MEDICINE | Facility: CLINIC | Age: 35
End: 2020-01-24

## 2020-08-25 ENCOUNTER — OFFICE VISIT (OUTPATIENT)
Dept: FAMILY MEDICINE | Facility: CLINIC | Age: 35
End: 2020-08-25

## 2020-08-25 VITALS
OXYGEN SATURATION: 99 % | HEIGHT: 63 IN | TEMPERATURE: 99.6 F | SYSTOLIC BLOOD PRESSURE: 96 MMHG | RESPIRATION RATE: 16 BRPM | DIASTOLIC BLOOD PRESSURE: 64 MMHG | WEIGHT: 104.13 LBS | BODY MASS INDEX: 18.45 KG/M2 | HEART RATE: 77 BPM

## 2020-08-25 DIAGNOSIS — Z13.0 SCREENING FOR ENDOCRINE, NUTRITIONAL, METABOLIC AND IMMUNITY DISORDER: ICD-10-CM

## 2020-08-25 DIAGNOSIS — Z13.29 SCREENING FOR ENDOCRINE, NUTRITIONAL, METABOLIC AND IMMUNITY DISORDER: ICD-10-CM

## 2020-08-25 DIAGNOSIS — D32.0 MENINGIOMA DETERMINED BY BIOPSY OF BRAIN (H): ICD-10-CM

## 2020-08-25 DIAGNOSIS — Z13.21 SCREENING FOR ENDOCRINE, NUTRITIONAL, METABOLIC AND IMMUNITY DISORDER: ICD-10-CM

## 2020-08-25 DIAGNOSIS — Z13.220 SCREENING FOR LIPOID DISORDERS: ICD-10-CM

## 2020-08-25 DIAGNOSIS — Z13.228 SCREENING FOR ENDOCRINE, NUTRITIONAL, METABOLIC AND IMMUNITY DISORDER: ICD-10-CM

## 2020-08-25 DIAGNOSIS — B07.8 COMMON WART: ICD-10-CM

## 2020-08-25 DIAGNOSIS — Z00.00 ROUTINE GENERAL MEDICAL EXAMINATION AT A HEALTH CARE FACILITY: Primary | ICD-10-CM

## 2020-08-25 PROCEDURE — 17110 DESTRUCTION B9 LES UP TO 14: CPT | Performed by: NURSE PRACTITIONER

## 2020-08-25 PROCEDURE — 99395 PREV VISIT EST AGE 18-39: CPT | Mod: 25 | Performed by: NURSE PRACTITIONER

## 2020-08-25 ASSESSMENT — ANXIETY QUESTIONNAIRES
IF YOU CHECKED OFF ANY PROBLEMS ON THIS QUESTIONNAIRE, HOW DIFFICULT HAVE THESE PROBLEMS MADE IT FOR YOU TO DO YOUR WORK, TAKE CARE OF THINGS AT HOME, OR GET ALONG WITH OTHER PEOPLE: NOT DIFFICULT AT ALL
7. FEELING AFRAID AS IF SOMETHING AWFUL MIGHT HAPPEN: NOT AT ALL
3. WORRYING TOO MUCH ABOUT DIFFERENT THINGS: NOT AT ALL
GAD7 TOTAL SCORE: 0
1. FEELING NERVOUS, ANXIOUS, OR ON EDGE: NOT AT ALL
5. BEING SO RESTLESS THAT IT IS HARD TO SIT STILL: NOT AT ALL
6. BECOMING EASILY ANNOYED OR IRRITABLE: NOT AT ALL
2. NOT BEING ABLE TO STOP OR CONTROL WORRYING: NOT AT ALL

## 2020-08-25 ASSESSMENT — PATIENT HEALTH QUESTIONNAIRE - PHQ9
5. POOR APPETITE OR OVEREATING: NOT AT ALL
SUM OF ALL RESPONSES TO PHQ QUESTIONS 1-9: 0

## 2020-08-25 ASSESSMENT — MIFFLIN-ST. JEOR: SCORE: 1123.5

## 2020-08-25 NOTE — PROGRESS NOTES
SUBJECTIVE:   CC: Cintia Randhawa is an 34 year old woman who presents for preventive health visit.     Healthy Habits:    Do you get at least three servings of calcium containing foods daily (dairy, green leafy vegetables, etc.)? yes    Amount of exercise or daily activities, outside of work: 3-4 day(s) per week    Problems taking medications regularly No    Medication side effects: Yes loss of appetite with Keppra    Have you had an eye exam in the past two years? yes    Do you see a dentist twice per year? yes    Do you have sleep apnea, excessive snoring or daytime drowsiness?no      No smoking  Three glasses of wine per week  Pap and breast exam through Dr. Mirza- reports her last Pap was 1/2020.  Hx meningioma and neurosurgery, follows with Dr. Gomes in [place]. On Keppra- generally tolerating this but her appetite is decreased. Taking in meal replacement supplements as needed.  Two kids- 7 year old Karol, 3 year old Harvey. Moving to [place] next week.  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  Warts to her RD3 and L knee, reports longstanding history of warts which she scrapes at home. Has had cryotherapy in the past, would like this again.    Today's PHQ-2 Score: No flowsheet data found.    PHQ 8/25/2020   PHQ-9 Total Score 0   Q9: Thoughts of better off dead/self-harm past 2 weeks Not at all     NAVARRO-7 SCORE 8/25/2020   Total Score 0         Abuse: Current or Past(Physical, Sexual or Emotional)- No  Do you feel safe in your environment? Yes        Social History     Tobacco Use     Smoking status: Never Smoker     Smokeless tobacco: Never Used   Substance Use Topics     Alcohol use: No     If you drink alcohol do you typically have >3 drinks per day or >7 drinks per week? No                     Reviewed orders with patient.  Reviewed health maintenance and updated orders accordingly - Yes  Lab work is in process  Labs reviewed in EPIC  BP Readings from Last 3 Encounters:    20 96/64   20 96/64   19 91/58    Wt Readings from Last 3 Encounters:   20 47.2 kg (104 lb 2 oz)   20 49.4 kg (108 lb 12.8 oz)   19 49.9 kg (110 lb)                  Patient Active Problem List   Diagnosis     Hx of meningioma of the brain     Gluten free diet     Brain mass      delivery delivered     Meningioma determined by biopsy of brain (H)     Previous  delivery affecting pregnancy     Past Surgical History:   Procedure Laterality Date     BRAIN SURGERY  2017    Meningioma - L parietal. Surgery at [facility].      BRAIN SURGERY  2019    meningioma      SECTION  2017       Social History     Tobacco Use     Smoking status: Never Smoker     Smokeless tobacco: Never Used   Substance Use Topics     Alcohol use: No     No family history on file.      Current Outpatient Medications   Medication Sig Dispense Refill     levETIRAcetam (KEPPRA) 500 MG tablet Take 2 tabs twice daily       Allergies   Allergen Reactions     Gluten Meal      Other reaction(s): GI UPSET  Celiac's disease     Erythromycin      Sulfa Drugs GI Disturbance     Fever, chills aches, vommitting     Lactose      Other reaction(s): GI UPSET       Mammogram not appropriate for this patient based on age.    Pertinent mammograms are reviewed under the imaging tab.  History of abnormal Pap smear: NO - age 30-65 PAP every 5 years with negative HPV co-testing recommended     Reviewed and updated as needed this visit by clinical staff         Reviewed and updated as needed this visit by Provider        Past Medical History:   Diagnosis Date     Meningioma (H)     7cm when she was 5 months pregnant. Neurologist in [place]      Past Surgical History:   Procedure Laterality Date     BRAIN SURGERY  2017    Meningioma - L parietal. Surgery at [facility].      BRAIN SURGERY  2019    meningioma      SECTION  2017     OB History   No obstetric history on file.       ROS:  Gen,  "HEENT, breast, CV, resp, GI, , MSK, heme/lymph, endo, skin, neuro, psych negative except as listed per HPI      OBJECTIVE:   Physical Exam:    BP 96/64   Pulse 77   Temp 99.6  F (37.6  C)   Resp 16   Ht 1.588 m (5' 2.5\")   Wt 47.2 kg (104 lb 2 oz)   LMP 08/08/2020 (Exact Date)   SpO2 99%   BMI 18.74 kg/m      CONSTITUTIONAL: Alert non-toxic appearing female in no acute distress  HEAD: Normocephalic, atraumatic  EYES: PERRLA; no scleral icterus; sclera without injection  ENT: EACs clear bilaterally, TMs pearly gray and intact with good visualization of bony landmarks and cone of light, no bulging or erythema; nares patent without ulceration or edema; oropharynx pink without lesions; posterior pharynx without exudates  NECK: Neck supple without lymphadenopathy, thyroid without enlargement or nodularity  RESPIRATORY: Lungs clear to auscultation, respirations unlabored  CV: Regular rate and rhythm, S1S2, no clicks, murmurs, rubs, or gallops; bilateral lower extremities without edema, dorsalis pedis pulses 2+ bilaterally  GASTROINTESTINAL: Normoactive bowel sounds x4 quadrants, abdomen soft, non-distended, and non-tender to palpation without masses or organomegaly  LYMPHATIC: Cervical, supraclavicular, and inguinal nodes without lymphadenopathy  MUSCULOSKELETAL: Moves all extremities, no obvious muscle wasting  NEUROLOGIC: No gross deficits, normal gait  SKIN: Appropriate color for race, warm and dry; flesh colored verrucous appearing lesions to RD3 DIP (x2) and to L patellar surface (x5)  PSYCHIATRIC: Pleasant and interactive, affect bright, makes appropriate eye contact, thought process logical    PROCEDURE:  Cryotherapy    After discussion of risks, benefits, indications, and alternative treatments, Cintia elected to proceed with cryotherapy for her warts. Lesions were gently pared down with a #10 blade and then treated with liquid nitrogen in a 30 second freeze/60 second thaw manner x2 rounds. Bandage " "applied. She tolerated this well without concerns.    Diagnostic Test Results:  Labs reviewed in Epic  No results found for this or any previous visit (from the past 24 hour(s)).    ASSESSMENT/PLAN:   Cintia was seen today for physical and medication problem.    Diagnoses and all orders for this visit:    Routine general medical examination at a health care facility  -     Lipid Panel ([facility])  -     Comp. Metabolic Panel (14) ([facility])    Generally healthy 34 year old female. Up to date on Pap, follows with OB-GYN for breast/pelvic exams. Exercises regularly with a healthy diet. Immunizations are up to date- due for fall influenza vaccine.     Common wart  -     DESTRUCT BENIGN LESION, UP TO 14    Warts x7 treated with cryotherapy- discussed after care. To apply Compound W the next 14 days and return to clinic for repeat cryotherapy.    Meningioma determined by biopsy of brain (H)    Doing well, due for repeat MRI in one year, will be on Keppra until that time. Follows with Dr. Gomes in [place].    COUNSELING:   Reviewed preventive health counseling, as reflected in patient instructions  Special attention given to:        Regular exercise       Healthy diet/nutrition       Alcohol Use    Estimated body mass index is 18.74 kg/m  as calculated from the following:    Height as of this encounter: 1.588 m (5' 2.5\").    Weight as of this encounter: 47.2 kg (104 lb 2 oz).         reports that she has never smoked. She has never used smokeless tobacco.      Counseling Resources:  ATP IV Guidelines  Pooled Cohorts Equation Calculator  Breast Cancer Risk Calculator  FRAX Risk Assessment  ICSI Preventive Guidelines  Dietary Guidelines for Americans, 2010  USDA's MyPlate  ASA Prophylaxis  Lung CA Screening    RANJANA Cancino [facility]  "

## 2020-08-25 NOTE — LETTER
[place] [id number] [place]  [address]  Phone: [phone]    August 27, 2020      Cintia Jameson Sydnee  [address] [place] [address]              Dear Cintia,    Your labs are gorgeous! No need for intervention.       Sincerely,     RANJANA Cancino CNP    Results for orders placed or performed in visit on 08/25/20   Lipid Panel ([facility])     Status: None   Result Value Ref Range    Cholesterol 149 100 - 199 mg/dL    Triglycerides 58 0 - 149 mg/dL    HDL Cholesterol 73 >39 mg/dL    VLDL Cholesterol Angel 12 5 - 40 mg/dL    LDL Cholesterol Calculated 64 0 - 99 mg/dL    LDL/HDL Ratio 0.9 0.0 - 3.2 ratio    Narrative    Performed at:  01 - [facility] [address], [place], [place]  [id number]  : Marshall Corral MD, Phone:  [phone]   Comp. Metabolic Panel (14) ([facility])     Status: None   Result Value Ref Range    Glucose 73 65 - 99 mg/dL    Urea Nitrogen 13 6 - 20 mg/dL    Creatinine 0.79 0.57 - 1.00 mg/dL    eGFR If NonAfricn Am 97 >59 mL/min/1.73    eGFR If Africn Am 111 >59 mL/min/1.73    BUN/Creatinine Ratio 16 9 - 23    Sodium 139 134 - 144 mmol/L    Potassium 4.2 3.5 - 5.2 mmol/L    Chloride 104 96 - 106 mmol/L    Total CO2 20 20 - 29 mmol/L    Calcium 9.4 8.7 - 10.2 mg/dL    Protein Total 7.3 6.0 - 8.5 g/dL    Albumin 4.8 3.8 - 4.8 g/dL    Globulin, Total 2.5 1.5 - 4.5 g/dL    A/G Ratio 1.9 1.2 - 2.2    Bilirubin Total 0.4 0.0 - 1.2 mg/dL    Alkaline Phosphatase 58 39 - 117 IU/L    AST 18 0 - 40 IU/L    ALT 14 0 - 32 IU/L    Narrative    Performed at:  01 - [facility]  [employer] [place], [place]  [id number]  : Marshall Corral MD, Phone:  [phone]

## 2020-08-25 NOTE — PATIENT INSTRUCTIONS
Preventive Health Recommendations  Female Ages 26 - 39  Yearly exam:   See your health care provider every year in order to    Review health changes.     Discuss preventive care.      Review your medicines if you your doctor has prescribed any.    Until age 30: Get a Pap test every three years (more often if you have had an abnormal result).    After age 30: Talk to your doctor about whether you should have a Pap test every 3 years or have a Pap test with HPV screening every 5 years.   You do not need a Pap test if your uterus was removed (hysterectomy) and you have not had cancer.  You should be tested each year for STDs (sexually transmitted diseases), if you're at risk.   Talk to your provider about how often to have your cholesterol checked.  If you are at risk for diabetes, you should have a diabetes test (fasting glucose).  Shots: Get a flu shot each year. Get a tetanus shot every 10 years.   Nutrition:     Eat at least 5 servings of fruits and vegetables each day.    Eat whole-grain bread, whole-wheat pasta and brown rice instead of white grains and rice.    Get adequate Calcium and Vitamin D.     Lifestyle    Exercise at least 150 minutes a week (30 minutes a day, 5 days of the week). This will help you control your weight and prevent disease.    Limit alcohol to one drink per day.    No smoking.     Wear sunscreen to prevent skin cancer.    See your dentist every six months for an exam and cleaning.    See Maria De Jesus in two weeks for the warts- compound W nightly the next two weeks    Patient Education     Treating Warts     You and your healthcare provider can discuss whether your warts need to be treated.     You and your healthcare provider can talk about what treatment may be best for your wart or warts. To get rid of your warts, your healthcare provider may need to try more than one type of treatment. The methods described below are often used to treat warts.  Types of treatment    Do nothing. Most warts  will resolve within 2 years, even without treatment. So doing nothing is sometimes a good option. This is particularly true for smaller warts that are not causing symptoms.    Cryotherapy (liquid nitrogen). This kills skin cells by freezing them. It kills the warts and destroys skin infected by the wart-causing virus. This is done in your healthcare provider s office and will cause some discomfort. It may take several treatments over several weeks to get rid of the warts.    Topical medicines. Prescribed topical medicines can be put on the skin. These are usually applied in the healthcare provider's office. But some prescriptions may be applied at home.    Over-the-counter (OTC) topical treatments. OTC medicines that most often contain salicylic acid may be an option. These patches, liquids, and creams are used at home. The medicine is applied daily to the wart and nearby skin. It's usually left on overnight. The dead skin is filed down the next day. In 1 to 3 days, the procedure can be repeated. Topical treatments are sometimes combined with cryotherapy.    Electrodessication and curettage (ED & C).  For this procedure, the healthcare provider applies numbing medicine to the wart. Then the wart is scraped or cut off. This type of treatment is usually not the first line of therapy.    Laser surgery.  This can vaporize wart tissue or destroy the blood vessels that feed the wart. This is done in the healthcare provider's office.    Shots (injections). These can be used to treat warts that don t respond to other treatments, such as stubborn or painful warts around the nails. This is done in the healthcare provider s office.    When to seek medical treatment  It s a good idea to have your healthcare provider check your warts. That way your provider can rule out any other skin problems. Sometimes a callous or a corn can look like a wart, but the treatments may differ. Treatment can also provide relief from warts that  bleed, burn, hurt, or itch. Genital warts should always be treated. They can spread to other people through sexual contact. And they may cause genital or cervical cancer.  After having your warts treated, new warts may still appear. Don t be discouraged. Warts often come back. See your healthcare provider again to discuss this. Your provider can tell you about the treatments that most likely will help clear your skin of warts.  Date Last Reviewed: 2/1/2017 2000-2019 [employer]. [address] [place] [address], [place] [address]. All rights reserved. This information is not intended as a substitute for professional medical care. Always follow your healthcare professional's instructions.

## 2020-08-26 ASSESSMENT — ANXIETY QUESTIONNAIRES: GAD7 TOTAL SCORE: 0

## 2020-08-27 LAB
ALBUMIN SERPL-MCNC: 4.8 G/DL (ref 3.8–4.8)
ALBUMIN/GLOB SERPL: 1.9 {RATIO} (ref 1.2–2.2)
ALP SERPL-CCNC: 58 IU/L (ref 39–117)
ALT SERPL-CCNC: 14 IU/L (ref 0–32)
AST SERPL-CCNC: 18 IU/L (ref 0–40)
BILIRUB SERPL-MCNC: 0.4 MG/DL (ref 0–1.2)
BUN SERPL-MCNC: 13 MG/DL (ref 6–20)
BUN/CREATININE RATIO: 16 (ref 9–23)
CALCIUM SERPL-MCNC: 9.4 MG/DL (ref 8.7–10.2)
CHLORIDE SERPLBLD-SCNC: 104 MMOL/L (ref 96–106)
CHOLEST SERPL-MCNC: 149 MG/DL (ref 100–199)
CREAT SERPL-MCNC: 0.79 MG/DL (ref 0.57–1)
EGFR IF AFRICN AM: 111 ML/MIN/1.73
EGFR IF NONAFRICN AM: 97 ML/MIN/1.73
GLOBULIN, TOTAL: 2.5 G/DL (ref 1.5–4.5)
GLUCOSE SERPL-MCNC: 73 MG/DL (ref 65–99)
HDLC SERPL-MCNC: 73 MG/DL
LDL/HDL RATIO: 0.9 RATIO (ref 0–3.2)
LDLC SERPL CALC-MCNC: 64 MG/DL (ref 0–99)
POTASSIUM SERPL-SCNC: 4.2 MMOL/L (ref 3.5–5.2)
PROT SERPL-MCNC: 7.3 G/DL (ref 6–8.5)
SODIUM SERPL-SCNC: 139 MMOL/L (ref 134–144)
TOTAL CO2: 20 MMOL/L (ref 20–29)
TRIGL SERPL-MCNC: 58 MG/DL (ref 0–149)
VLDLC SERPL CALC-MCNC: 12 MG/DL (ref 5–40)

## 2020-09-10 ENCOUNTER — OFFICE VISIT (OUTPATIENT)
Dept: FAMILY MEDICINE | Facility: CLINIC | Age: 35
End: 2020-09-10

## 2020-09-10 VITALS
HEART RATE: 71 BPM | SYSTOLIC BLOOD PRESSURE: 96 MMHG | RESPIRATION RATE: 16 BRPM | TEMPERATURE: 98.7 F | OXYGEN SATURATION: 98 % | BODY MASS INDEX: 19.08 KG/M2 | DIASTOLIC BLOOD PRESSURE: 64 MMHG | WEIGHT: 106 LBS

## 2020-09-10 DIAGNOSIS — B07.8 COMMON WART: Primary | ICD-10-CM

## 2020-09-10 PROCEDURE — 99207 ZZC NO CHARGE LOS: CPT | Performed by: NURSE PRACTITIONER

## 2020-09-10 PROCEDURE — 17110 DESTRUCTION B9 LES UP TO 14: CPT | Performed by: NURSE PRACTITIONER

## 2020-09-10 NOTE — PROGRESS NOTES
Problem(s) Oriented visit        SUBJECTIVE:                                                    Cintia Randhawa is a 34 year old female who presents to clinic today for the following health issues :    Wart follow up- here for second round of cryotherapy. Tolerated well- lesions blistered and fell off. Used Compound W regularly. Still slightly uncomfortable at times.      OBJECTIVE:                                                    BP 96/64   Pulse 71   Temp 98.7  F (37.1  C) (Skin)   Resp 16   Wt 48.1 kg (106 lb)   SpO2 98%   BMI 19.08 kg/m    Body mass index is 19.08 kg/m .   Alert non-toxic appearing female in no acute distress   SKIN: Appropriate color for race, warm and dry; flesh colored verrucous appearing lesions to RD3 DIP (x2) and to L patellar surface (x5)- lesions appear smaller and flattened compared to previous       PROCEDURE:  Cryotherapy     After discussion of risks, benefits, indications, and alternative treatments, Cintia elected to proceed with cryotherapy for her warts. Lesions were gently pared down with a #10 blade and then treated with liquid nitrogen in a 30 second freeze/60 second thaw manner x2 rounds. Bandage applied. She tolerated this well without concerns.    ASSESSMENT/PLAN:                                                        Cintia was seen today for wart.    Diagnoses and all orders for this visit:    Common wart  -     DESTRUCT BENIGN LESION, UP TO 14      Tolerated cryotherapy well. Reviewed after cares. Return to clinic in 2-4 weeks if she remains symptomatic.    Patient needs assistance with ADLs: none identified today  Patient needs assistance with iADLs: none identified today    The following health maintenance items are reviewed in Epic and correct as of today:  Health Maintenance   Topic Date Due     PAP  11/02/2004     PREVENTIVE CARE VISIT  08/25/2021     DTAP/TDAP/TD IMMUNIZATION (10 - Td) 06/29/2027     HIV SCREENING  Completed     PHQ-2  Completed      IPV IMMUNIZATION  Completed     MENINGITIS IMMUNIZATION  Completed     HEPATITIS B IMMUNIZATION  Completed     INFLUENZA VACCINE  Addressed       Patient Instructions   Compound W regularly, return for repeat cryotherapy in 2-4 weeks if your lesions remain bothersome      RANJANA Cancino CNP  [facility]  [phone]    For any issues my office # is [phone]

## 2020-09-10 NOTE — PATIENT INSTRUCTIONS
Deyanira W regularly, return for repeat cryotherapy in 2-4 weeks if your lesions remain bothersome

## 2022-05-29 ENCOUNTER — HOSPITAL ENCOUNTER (EMERGENCY)
Age: 37
Discharge: HOME | End: 2022-05-29

## 2022-05-29 ENCOUNTER — APPOINTMENT (OUTPATIENT)
Dept: CT IMAGING | Age: 37
End: 2022-05-29

## 2022-05-29 ENCOUNTER — APPOINTMENT (OUTPATIENT)
Dept: ULTRASOUND IMAGING | Age: 37
End: 2022-05-29

## 2022-05-29 VITALS
BODY MASS INDEX: 19.64 KG/M2 | DIASTOLIC BLOOD PRESSURE: 59 MMHG | TEMPERATURE: 97.1 F | WEIGHT: 106.7 LBS | HEART RATE: 66 BPM | RESPIRATION RATE: 12 BRPM | SYSTOLIC BLOOD PRESSURE: 100 MMHG | OXYGEN SATURATION: 99 % | HEIGHT: 62 IN

## 2022-05-29 DIAGNOSIS — R10.11 ABDOMINAL PAIN, RIGHT UPPER QUADRANT: Primary | ICD-10-CM

## 2022-05-29 DIAGNOSIS — K59.00 CONSTIPATION, UNSPECIFIED CONSTIPATION TYPE: ICD-10-CM

## 2022-05-29 LAB
ALBUMIN SERPL-MCNC: 4.8 G/DL (ref 3.6–5.1)
ALBUMIN/GLOB SERPL: 1.5 {RATIO} (ref 1–2.4)
ALP SERPL-CCNC: 109 UNITS/L (ref 45–117)
ALT SERPL-CCNC: 26 UNITS/L
ANION GAP SERPL CALC-SCNC: 10 MMOL/L (ref 7–19)
APPEARANCE UR: CLEAR
AST SERPL-CCNC: 16 UNITS/L
BACTERIA #/AREA URNS HPF: ABNORMAL /HPF
BASOPHILS # BLD: 0.1 K/MCL (ref 0–0.3)
BASOPHILS NFR BLD: 2 %
BILIRUB SERPL-MCNC: 0.4 MG/DL (ref 0.2–1)
BILIRUB UR QL STRIP: NEGATIVE
BUN SERPL-MCNC: 15 MG/DL (ref 6–20)
BUN/CREAT SERPL: 19 (ref 7–25)
CALCIUM SERPL-MCNC: 9.6 MG/DL (ref 8.4–10.2)
CHLORIDE SERPL-SCNC: 105 MMOL/L (ref 97–110)
CO2 SERPL-SCNC: 32 MMOL/L (ref 21–32)
COLOR UR: ABNORMAL
CREAT SERPL-MCNC: 0.77 MG/DL (ref 0.51–0.95)
DEPRECATED RDW RBC: 42.1 FL (ref 39–50)
EOSINOPHIL # BLD: 0.4 K/MCL (ref 0–0.5)
EOSINOPHIL NFR BLD: 8 %
ERYTHROCYTE [DISTWIDTH] IN BLOOD: 12.8 % (ref 11–15)
FASTING DURATION TIME PATIENT: NORMAL H
GFR SERPLBLD BASED ON 1.73 SQ M-ARVRAT: >90 ML/MIN
GLOBULIN SER-MCNC: 3.2 G/DL (ref 2–4)
GLUCOSE SERPL-MCNC: 87 MG/DL (ref 70–99)
GLUCOSE UR STRIP-MCNC: NEGATIVE MG/DL
HCG UR QL: NEGATIVE
HCT VFR BLD CALC: 43.1 % (ref 36–46.5)
HGB BLD-MCNC: 14.6 G/DL (ref 12–15.5)
HGB UR QL STRIP: NEGATIVE
HYALINE CASTS #/AREA URNS LPF: ABNORMAL /LPF
IMM GRANULOCYTES # BLD AUTO: 0 K/MCL (ref 0–0.2)
IMM GRANULOCYTES # BLD: 0 %
KETONES UR STRIP-MCNC: NEGATIVE MG/DL
LEUKOCYTE ESTERASE UR QL STRIP: NEGATIVE
LIPASE SERPL-CCNC: 84 UNITS/L (ref 73–393)
LYMPHOCYTES # BLD: 1.9 K/MCL (ref 1–4.8)
LYMPHOCYTES NFR BLD: 32 %
MAGNESIUM SERPL-MCNC: 2.1 MG/DL (ref 1.7–2.4)
MCH RBC QN AUTO: 30.4 PG (ref 26–34)
MCHC RBC AUTO-ENTMCNC: 33.9 G/DL (ref 32–36.5)
MCV RBC AUTO: 89.6 FL (ref 78–100)
MONOCYTES # BLD: 0.5 K/MCL (ref 0.3–0.9)
MONOCYTES NFR BLD: 8 %
NEUTROPHILS # BLD: 3 K/MCL (ref 1.8–7.7)
NEUTROPHILS NFR BLD: 50 %
NITRITE UR QL STRIP: NEGATIVE
NRBC BLD MANUAL-RTO: 0 /100 WBC
PH UR STRIP: 7 [PH] (ref 5–7)
PLATELET # BLD AUTO: 197 K/MCL (ref 140–450)
POTASSIUM SERPL-SCNC: 4.3 MMOL/L (ref 3.4–5.1)
PROT SERPL-MCNC: 8 G/DL (ref 6.4–8.2)
PROT UR STRIP-MCNC: NEGATIVE MG/DL
RAINBOW EXTRA TUBES HOLD SPECIMEN: NORMAL
RAINBOW EXTRA TUBES HOLD SPECIMEN: NORMAL
RBC # BLD: 4.81 MIL/MCL (ref 4–5.2)
RBC #/AREA URNS HPF: ABNORMAL /HPF
SODIUM SERPL-SCNC: 143 MMOL/L (ref 135–145)
SP GR UR STRIP: <1.005 (ref 1–1.03)
SQUAMOUS #/AREA URNS HPF: ABNORMAL /HPF
UROBILINOGEN UR STRIP-MCNC: 0.2 MG/DL
WBC # BLD: 5.8 K/MCL (ref 4.2–11)
WBC #/AREA URNS HPF: ABNORMAL /HPF

## 2022-05-29 PROCEDURE — 74176 CT ABD & PELVIS W/O CONTRAST: CPT

## 2022-05-29 PROCEDURE — 76705 ECHO EXAM OF ABDOMEN: CPT

## 2022-05-29 PROCEDURE — 74176 CT ABD & PELVIS W/O CONTRAST: CPT | Performed by: RADIOLOGY

## 2022-05-29 PROCEDURE — G1004 CDSM NDSC: HCPCS | Performed by: RADIOLOGY

## 2022-05-29 PROCEDURE — G1004 CDSM NDSC: HCPCS

## 2022-05-29 PROCEDURE — 99284 EMERGENCY DEPT VISIT MOD MDM: CPT | Performed by: PHYSICIAN ASSISTANT

## 2022-05-29 PROCEDURE — 84703 CHORIONIC GONADOTROPIN ASSAY: CPT

## 2022-05-29 PROCEDURE — 99284 EMERGENCY DEPT VISIT MOD MDM: CPT

## 2022-05-29 PROCEDURE — 80053 COMPREHEN METABOLIC PANEL: CPT | Performed by: PHYSICIAN ASSISTANT

## 2022-05-29 PROCEDURE — 81001 URINALYSIS AUTO W/SCOPE: CPT | Performed by: PHYSICIAN ASSISTANT

## 2022-05-29 PROCEDURE — 83690 ASSAY OF LIPASE: CPT | Performed by: PHYSICIAN ASSISTANT

## 2022-05-29 PROCEDURE — 83735 ASSAY OF MAGNESIUM: CPT | Performed by: PHYSICIAN ASSISTANT

## 2022-05-29 PROCEDURE — 85025 COMPLETE CBC W/AUTO DIFF WBC: CPT | Performed by: PHYSICIAN ASSISTANT

## 2022-05-29 PROCEDURE — 36415 COLL VENOUS BLD VENIPUNCTURE: CPT

## 2022-05-29 PROCEDURE — 76705 ECHO EXAM OF ABDOMEN: CPT | Performed by: RADIOLOGY

## 2022-05-29 RX ORDER — LEVETIRACETAM 500 MG/1
500 TABLET ORAL 2 TIMES DAILY
COMMUNITY

## 2022-05-29 ASSESSMENT — PAIN SCALES - GENERAL: PAINLEVEL_OUTOF10: 5

## 2023-04-24 ENCOUNTER — INCIDENTAL FINDING (OUTPATIENT)
Dept: GENERAL RADIOLOGY | Age: 38
End: 2023-04-24

## 2024-11-06 ENCOUNTER — HOSPITAL ENCOUNTER (INPATIENT)
Age: 39
LOS: 1 days | Discharge: HOME | DRG: 776 | End: 2024-11-07
Attending: INTERNAL MEDICINE | Admitting: INTERNAL MEDICINE

## 2024-11-06 ENCOUNTER — APPOINTMENT (OUTPATIENT)
Dept: CT IMAGING | Age: 39
DRG: 776 | End: 2024-11-06
Attending: EMERGENCY MEDICINE

## 2024-11-06 ENCOUNTER — APPOINTMENT (OUTPATIENT)
Dept: ULTRASOUND IMAGING | Age: 39
DRG: 776 | End: 2024-11-06
Attending: PHYSICIAN ASSISTANT

## 2024-11-06 DIAGNOSIS — I34.0 NONRHEUMATIC MITRAL VALVE REGURGITATION: ICD-10-CM

## 2024-11-06 DIAGNOSIS — K81.0 ACUTE CHOLECYSTITIS: ICD-10-CM

## 2024-11-06 DIAGNOSIS — R00.1 BRADYCARDIA: Primary | ICD-10-CM

## 2024-11-06 LAB
ALBUMIN SERPL-MCNC: 2.7 G/DL (ref 3.4–5)
ALBUMIN/GLOB SERPL: 0.8 {RATIO} (ref 1–2.4)
ALP SERPL-CCNC: 131 UNITS/L (ref 45–117)
ALT SERPL-CCNC: 76 UNITS/L
ANION GAP SERPL CALC-SCNC: 11 MMOL/L (ref 7–19)
AST SERPL-CCNC: 57 UNITS/L
ATRIAL RATE (BPM): 46
BASOPHILS # BLD: 0.1 K/MCL (ref 0–0.3)
BASOPHILS NFR BLD: 1 %
BILIRUB SERPL-MCNC: 0.2 MG/DL (ref 0.2–1)
BUN SERPL-MCNC: 16 MG/DL (ref 6–20)
BUN/CREAT SERPL: 24 (ref 7–25)
CALCIUM SERPL-MCNC: 8.5 MG/DL (ref 8.4–10.2)
CHLORIDE SERPL-SCNC: 106 MMOL/L (ref 97–110)
CO2 SERPL-SCNC: 25 MMOL/L (ref 21–32)
CREAT SERPL-MCNC: 0.68 MG/DL (ref 0.51–0.95)
CRP SERPL-MCNC: 10.3 MG/L
DEPRECATED RDW RBC: 41.5 FL (ref 39–50)
DIASTOLIC BLOOD PRESSURE: 68
EGFRCR SERPLBLD CKD-EPI 2021: >90 ML/MIN/{1.73_M2}
EOSINOPHIL # BLD: 0.2 K/MCL (ref 0–0.5)
EOSINOPHIL NFR BLD: 3 %
ERYTHROCYTE [DISTWIDTH] IN BLOOD: 12.9 % (ref 11–15)
ERYTHROCYTE [SEDIMENTATION RATE] IN BLOOD BY WESTERGREN METHOD: 12 MM/HR (ref 0–20)
FASTING DURATION TIME PATIENT: ABNORMAL H
FLUAV RNA RESP QL NAA+PROBE: NOT DETECTED
FLUBV RNA RESP QL NAA+PROBE: NOT DETECTED
GLOBULIN SER-MCNC: 3.6 G/DL (ref 2–4)
GLUCOSE BLDC GLUCOMTR-MCNC: 87 MG/DL (ref 70–99)
GLUCOSE SERPL-MCNC: 69 MG/DL (ref 70–99)
HCT VFR BLD CALC: 38.3 % (ref 36–46.5)
HGB BLD-MCNC: 12.6 G/DL (ref 12–15.5)
IMM GRANULOCYTES # BLD AUTO: 0 K/MCL (ref 0–0.2)
IMM GRANULOCYTES # BLD: 0 %
LIPASE SERPL-CCNC: 26 UNITS/L (ref 15–77)
LYMPHOCYTES # BLD: 2.1 K/MCL (ref 1–4.8)
LYMPHOCYTES NFR BLD: 29 %
MAGNESIUM SERPL-MCNC: 1.9 MG/DL (ref 1.7–2.4)
MCH RBC QN AUTO: 29 PG (ref 26–34)
MCHC RBC AUTO-ENTMCNC: 32.9 G/DL (ref 32–36.5)
MCV RBC AUTO: 88 FL (ref 78–100)
MONOCYTES # BLD: 0.6 K/MCL (ref 0.3–0.9)
MONOCYTES NFR BLD: 8 %
NEUTROPHILS # BLD: 4.3 K/MCL (ref 1.8–7.7)
NEUTROPHILS NFR BLD: 59 %
NRBC BLD MANUAL-RTO: 0 /100 WBC
NT-PROBNP SERPL-MCNC: 909 PG/ML
P AXIS (DEGREES): 5
PLATELET # BLD AUTO: 231 K/MCL (ref 140–450)
POTASSIUM SERPL-SCNC: 4 MMOL/L (ref 3.4–5.1)
PR-INTERVAL (MSEC): 148
PROT SERPL-MCNC: 6.3 G/DL (ref 6.4–8.2)
QRS-INTERVAL (MSEC): 84
QT-INTERVAL (MSEC): 442
QTC: 387
R AXIS (DEGREES): 62
RBC # BLD: 4.35 MIL/MCL (ref 4–5.2)
REPORT TEXT: NORMAL
SARS-COV-2 RNA RESP QL NAA+PROBE: NOT DETECTED
SERVICE CMNT-IMP: NORMAL
SERVICE CMNT-IMP: NORMAL
SODIUM SERPL-SCNC: 138 MMOL/L (ref 135–145)
SYSTOLIC BLOOD PRESSURE: 132
T AXIS (DEGREES): 47
TROPONIN I SERPL DL<=0.01 NG/ML-MCNC: 7 NG/L
TROPONIN I SERPL DL<=0.01 NG/ML-MCNC: 7 NG/L
TSH SERPL-ACNC: 2.99 MCUNITS/ML (ref 0.35–5)
VENTRICULAR RATE EKG/MIN (BPM): 46
WBC # BLD: 7.3 K/MCL (ref 4.2–11)

## 2024-11-06 PROCEDURE — 10002803 HB RX 637: Performed by: PHYSICIAN ASSISTANT

## 2024-11-06 PROCEDURE — 74177 CT ABD & PELVIS W/CONTRAST: CPT

## 2024-11-06 PROCEDURE — 93005 ELECTROCARDIOGRAM TRACING: CPT | Performed by: PHYSICIAN ASSISTANT

## 2024-11-06 PROCEDURE — 10004651 HB RX, NO CHARGE ITEM: Performed by: RADIOLOGY

## 2024-11-06 PROCEDURE — 10000002 HB ROOM CHARGE MED SURG

## 2024-11-06 PROCEDURE — 71275 CT ANGIOGRAPHY CHEST: CPT

## 2024-11-06 PROCEDURE — 85025 COMPLETE CBC W/AUTO DIFF WBC: CPT | Performed by: PHYSICIAN ASSISTANT

## 2024-11-06 PROCEDURE — 83880 ASSAY OF NATRIURETIC PEPTIDE: CPT | Performed by: PHYSICIAN ASSISTANT

## 2024-11-06 PROCEDURE — 84443 ASSAY THYROID STIM HORMONE: CPT | Performed by: PHYSICIAN ASSISTANT

## 2024-11-06 PROCEDURE — 76705 ECHO EXAM OF ABDOMEN: CPT

## 2024-11-06 PROCEDURE — 0240U SARS-COV-2/INFLUENZA BY PCR: CPT | Performed by: EMERGENCY MEDICINE

## 2024-11-06 PROCEDURE — 10002800 HB RX 250 W HCPCS: Performed by: PHYSICIAN ASSISTANT

## 2024-11-06 PROCEDURE — 99285 EMERGENCY DEPT VISIT HI MDM: CPT

## 2024-11-06 PROCEDURE — 82962 GLUCOSE BLOOD TEST: CPT

## 2024-11-06 PROCEDURE — 96360 HYDRATION IV INFUSION INIT: CPT

## 2024-11-06 PROCEDURE — 10002807 HB RX 258: Performed by: PHYSICIAN ASSISTANT

## 2024-11-06 PROCEDURE — 99285 EMERGENCY DEPT VISIT HI MDM: CPT | Performed by: PHYSICIAN ASSISTANT

## 2024-11-06 PROCEDURE — 10002807 HB RX 258: Performed by: RADIOLOGY

## 2024-11-06 PROCEDURE — 83690 ASSAY OF LIPASE: CPT | Performed by: PHYSICIAN ASSISTANT

## 2024-11-06 PROCEDURE — 93010 ELECTROCARDIOGRAM REPORT: CPT | Performed by: INTERNAL MEDICINE

## 2024-11-06 PROCEDURE — 80053 COMPREHEN METABOLIC PANEL: CPT | Performed by: PHYSICIAN ASSISTANT

## 2024-11-06 PROCEDURE — 10002805 HB CONTRAST AGENT: Performed by: RADIOLOGY

## 2024-11-06 PROCEDURE — 84484 ASSAY OF TROPONIN QUANT: CPT | Performed by: PHYSICIAN ASSISTANT

## 2024-11-06 PROCEDURE — 86140 C-REACTIVE PROTEIN: CPT | Performed by: PHYSICIAN ASSISTANT

## 2024-11-06 PROCEDURE — 10003445 HB TELEMETRY PER DAY

## 2024-11-06 PROCEDURE — 83735 ASSAY OF MAGNESIUM: CPT | Performed by: PHYSICIAN ASSISTANT

## 2024-11-06 PROCEDURE — 36000 PLACE NEEDLE IN VEIN: CPT

## 2024-11-06 PROCEDURE — 96365 THER/PROPH/DIAG IV INF INIT: CPT

## 2024-11-06 PROCEDURE — 85652 RBC SED RATE AUTOMATED: CPT | Performed by: PHYSICIAN ASSISTANT

## 2024-11-06 PROCEDURE — 93308 TTE F-UP OR LMTD: CPT | Performed by: EMERGENCY MEDICINE

## 2024-11-06 RX ORDER — SODIUM CHLORIDE 9 MG/ML
INJECTION, SOLUTION INTRAVENOUS CONTINUOUS
Status: DISCONTINUED | OUTPATIENT
Start: 2024-11-06 | End: 2024-11-07 | Stop reason: HOSPADM

## 2024-11-06 RX ORDER — LEVETIRACETAM 500 MG/1
750 TABLET ORAL ONCE
Status: COMPLETED | OUTPATIENT
Start: 2024-11-06 | End: 2024-11-06

## 2024-11-06 RX ORDER — 0.9 % SODIUM CHLORIDE 0.9 %
10 VIAL (ML) INJECTION ONCE
Status: COMPLETED | OUTPATIENT
Start: 2024-11-06 | End: 2024-11-06

## 2024-11-06 RX ADMIN — SODIUM CHLORIDE 10 ML: 9 INJECTION, SOLUTION INTRAMUSCULAR; INTRAVENOUS; SUBCUTANEOUS at 21:03

## 2024-11-06 RX ADMIN — PIPERACILLIN AND TAZOBACTAM 4.5 G: 4; .5 INJECTION, POWDER, FOR SOLUTION INTRAVENOUS at 23:56

## 2024-11-06 RX ADMIN — SODIUM CHLORIDE 100 ML: 9 INJECTION, SOLUTION INTRAVENOUS at 21:03

## 2024-11-06 RX ADMIN — SODIUM CHLORIDE: 9 INJECTION, SOLUTION INTRAVENOUS at 23:55

## 2024-11-06 RX ADMIN — IOHEXOL 100 ML: 350 INJECTION, SOLUTION INTRAVENOUS at 21:03

## 2024-11-06 RX ADMIN — LEVETIRACETAM 750 MG: 500 TABLET, FILM COATED ORAL at 22:31

## 2024-11-06 SDOH — SOCIAL STABILITY: SOCIAL INSECURITY: HOW OFTEN DOES ANYONE, INCLUDING FAMILY AND FRIENDS, INSULT OR TALK DOWN TO YOU?: NEVER

## 2024-11-06 SDOH — SOCIAL STABILITY: SOCIAL INSECURITY: HOW OFTEN DOES ANYONE, INCLUDING FAMILY AND FRIENDS, THREATEN YOU WITH HARM?: NEVER

## 2024-11-06 SDOH — SOCIAL STABILITY: SOCIAL INSECURITY: HOW OFTEN DOES ANYONE, INCLUDING FAMILY AND FRIENDS, SCREAM OR CURSE AT YOU?: NEVER

## 2024-11-06 SDOH — SOCIAL STABILITY: SOCIAL INSECURITY: HOW OFTEN DOES ANYONE, INCLUDING FAMILY AND FRIENDS, PHYSICALLY HURT YOU?: NEVER

## 2024-11-06 ASSESSMENT — ENCOUNTER SYMPTOMS
ACTIVITY CHANGE: 0
STRIDOR: 0
TROUBLE SWALLOWING: 0
ABDOMINAL DISTENTION: 0
FEVER: 0
DIZZINESS: 0
COUGH: 0
RHINORRHEA: 0
BACK PAIN: 0
VOMITING: 0
APPETITE CHANGE: 0
WHEEZING: 0
HEADACHES: 0
LIGHT-HEADEDNESS: 0
SEIZURES: 0
NUMBNESS: 0
CHOKING: 0
ADENOPATHY: 0
COLOR CHANGE: 0
NAUSEA: 0
FATIGUE: 0
AGITATION: 0
WEAKNESS: 0
SHORTNESS OF BREATH: 1
DIARRHEA: 0
APNEA: 0
DIAPHORESIS: 0
SORE THROAT: 0
CONFUSION: 0
TREMORS: 0
ABDOMINAL PAIN: 0
UNEXPECTED WEIGHT CHANGE: 0
FACIAL SWELLING: 0
NERVOUS/ANXIOUS: 0
CHEST TIGHTNESS: 0
CONSTIPATION: 0
CHILLS: 0

## 2024-11-06 ASSESSMENT — HEART SCORE
HISTORY: SLIGHTLY SUSPICIOUS
HEART SCORE: 0
RISK FACTORS: NO RISK FACTORS KNOWN
TROPONIN: EQUAL OR LESS THAN NORMAL LIMIT
AGE: LESS THAN OR EQUAL TO 45
EKG: NORMAL

## 2024-11-06 ASSESSMENT — PAIN SCALES - GENERAL: PAINLEVEL_OUTOF10: 0

## 2024-11-07 ENCOUNTER — APPOINTMENT (OUTPATIENT)
Dept: CARDIOLOGY | Age: 39
DRG: 776 | End: 2024-11-07
Attending: NURSE PRACTITIONER

## 2024-11-07 ENCOUNTER — APPOINTMENT (OUTPATIENT)
Dept: NUCLEAR MEDICINE | Age: 39
DRG: 776 | End: 2024-11-07

## 2024-11-07 VITALS
TEMPERATURE: 98.2 F | WEIGHT: 137.5 LBS | HEART RATE: 45 BPM | RESPIRATION RATE: 18 BRPM | HEIGHT: 62 IN | BODY MASS INDEX: 25.3 KG/M2 | DIASTOLIC BLOOD PRESSURE: 69 MMHG | OXYGEN SATURATION: 98 % | SYSTOLIC BLOOD PRESSURE: 113 MMHG

## 2024-11-07 PROBLEM — Z98.891 HX OF CESAREAN SECTION: Status: ACTIVE | Noted: 2024-04-08

## 2024-11-07 PROBLEM — K81.0 ACUTE CHOLECYSTITIS: Status: ACTIVE | Noted: 2024-11-07

## 2024-11-07 PROBLEM — Z98.891 HX OF CESAREAN SECTION: Chronic | Status: ACTIVE | Noted: 2024-04-08

## 2024-11-07 LAB
ALBUMIN SERPL-MCNC: 2.1 G/DL (ref 3.4–5)
ALP SERPL-CCNC: 101 UNITS/L (ref 45–117)
ALT SERPL-CCNC: 59 UNITS/L
ANION GAP SERPL CALC-SCNC: 13 MMOL/L (ref 7–19)
AORTIC VALVE AREA: 1.95 CM²
ASCENDING AORTA (AAD): 2.91 CM
AST SERPL-CCNC: 36 UNITS/L
ATRIAL RATE (BPM): 39
AV MEAN GRADIENT (AVMG): 3.03 MMHG
AV PEAK GRADIENT (AVPG): 6.04 MMHG
AV PEAK VELOCITY (AVPV): 1.2 M/S
AVI LVOT PEAK GRADIENT (LVOTMG): 1.85 MMHG
BASOPHILS # BLD: 0.1 K/MCL (ref 0–0.3)
BASOPHILS NFR BLD: 1 %
BILIRUB CONJ SERPL-MCNC: 0.1 MG/DL (ref 0–0.2)
BILIRUB SERPL-MCNC: 0.3 MG/DL (ref 0.2–1)
BUN SERPL-MCNC: 13 MG/DL (ref 6–20)
BUN/CREAT SERPL: 19 (ref 7–25)
CALCIUM SERPL-MCNC: 7.6 MG/DL (ref 8.4–10.2)
CHLORIDE SERPL-SCNC: 110 MMOL/L (ref 97–110)
CO2 SERPL-SCNC: 21 MMOL/L (ref 21–32)
CREAT SERPL-MCNC: 0.69 MG/DL (ref 0.51–0.95)
DEPRECATED RDW RBC: 42 FL (ref 39–50)
DIASTOLIC BLOOD PRESSURE: 61
DOP CALC LVOT PEAK VEL (LVOTPV): 1 M/S
E WAVE DECELARATION TIME (MDT): 0.15 S
EGFRCR SERPLBLD CKD-EPI 2021: >90 ML/MIN/{1.73_M2}
EOSINOPHIL # BLD: 0.2 K/MCL (ref 0–0.5)
EOSINOPHIL NFR BLD: 3 %
ERYTHROCYTE [DISTWIDTH] IN BLOOD: 13 % (ref 11–15)
EST RIGHT VENT SYSTOLIC PRESSURE BY TRICUSPID REGURGITATION JET (RVSP): 44.65 MMHG
FASTING DURATION TIME PATIENT: ABNORMAL H
GLUCOSE SERPL-MCNC: 77 MG/DL (ref 70–99)
HCT VFR BLD CALC: 32.1 % (ref 36–46.5)
HGB BLD-MCNC: 10.6 G/DL (ref 12–15.5)
IMM GRANULOCYTES # BLD AUTO: 0 K/MCL (ref 0–0.2)
IMM GRANULOCYTES # BLD: 0 %
INTERVENTRICULAR SEPTUM IN END DIASTOLE (IVSD): 0.73 CM
LEFT INTERNAL DIMENSION IN SYSTOLE (LVSD): 3.03 CM
LEFT VENTRICULAR INTERNAL DIMENSION IN DIASTOLE (LVDD): 4.89 CM
LEFT VENTRICULAR POSTERIOR WALL IN END DIASTOLE (LVPW): 0.68 CM
LV EF: 66 %
LV END SYSTOLIC LONGITUDINAL STRAIN GLOBAL (LVGS): -22.2 %
LVOT 2D (LVOTD): 1.78 CM
LVOT VTI (LVOTVTI): 24.04 CM
LYMPHOCYTES # BLD: 1.5 K/MCL (ref 1–4.8)
LYMPHOCYTES NFR BLD: 27 %
MCH RBC QN AUTO: 29.4 PG (ref 26–34)
MCHC RBC AUTO-ENTMCNC: 33 G/DL (ref 32–36.5)
MCV RBC AUTO: 88.9 FL (ref 78–100)
MONOCYTES # BLD: 0.5 K/MCL (ref 0.3–0.9)
MONOCYTES NFR BLD: 9 %
MV E TISSUE VEL LAT (MELV): 0.1 M/S
MV E TISSUE VEL MED (MESV): 0.1 M/S
MV E WAVE VEL/E TISSUE VEL MED(MSR): 11.22 UNITLESS
MV PEAK A VELOCITY (MVPAV): 0.3 M/S
MV PEAK E VELOCITY (MVPEV): 1.1 M/S
NEUTROPHILS # BLD: 3.1 K/MCL (ref 1.8–7.7)
NEUTROPHILS NFR BLD: 60 %
NRBC BLD MANUAL-RTO: 0 /100 WBC
P AXIS (DEGREES): 4
PLATELET # BLD AUTO: 183 K/MCL (ref 140–450)
POTASSIUM SERPL-SCNC: 3.9 MMOL/L (ref 3.4–5.1)
PR-INTERVAL (MSEC): 152
PROT SERPL-MCNC: 5.1 G/DL (ref 6.4–8.2)
PV PEAK VELOCITY (PVPV): 0.7 M/S
QRS-INTERVAL (MSEC): 80
QT-INTERVAL (MSEC): 480
QTC: 386
R AXIS (DEGREES): 69
RAINBOW EXTRA TUBES HOLD SPECIMEN: NORMAL
RBC # BLD: 3.61 MIL/MCL (ref 4–5.2)
REPORT TEXT: NORMAL
RIGHT VENTRICULAR AREA CHANGE (APICAL 4-CHAMBER VIEW) (RVFAC): 47.6 %
SINUSES OF VALSALVA (SVD): 2.34 CM
SODIUM SERPL-SCNC: 140 MMOL/L (ref 135–145)
SYSTOLIC BLOOD PRESSURE: 159
T AXIS (DEGREES): 44
TRICUSPID ANNULAR PLANE SYSTOLIC EXCURSION (TAPSE): 2.4 CM
TRICUSPID VALVE ANNULAR PEAK VELOCITY (TVAPV): 0.1 M/S
TRICUSPID VALVE PEAK REGURGITATION VELOCITY (TRPV): 2.7 M/S
TV ESTIMATED RIGHT ARTERIAL PRESSURE (RAP): 15 MMHG
VENTRICULAR RATE EKG/MIN (BPM): 39
WBC # BLD: 5.3 K/MCL (ref 4.2–11)

## 2024-11-07 PROCEDURE — 93356 MYOCRD STRAIN IMG SPCKL TRCK: CPT

## 2024-11-07 PROCEDURE — 93356 MYOCRD STRAIN IMG SPCKL TRCK: CPT | Performed by: INTERNAL MEDICINE

## 2024-11-07 PROCEDURE — 36415 COLL VENOUS BLD VENIPUNCTURE: CPT | Performed by: INTERNAL MEDICINE

## 2024-11-07 PROCEDURE — 10002800 HB RX 250 W HCPCS: Performed by: INTERNAL MEDICINE

## 2024-11-07 PROCEDURE — 96376 TX/PRO/DX INJ SAME DRUG ADON: CPT

## 2024-11-07 PROCEDURE — 10002807 HB RX 258: Performed by: INTERNAL MEDICINE

## 2024-11-07 PROCEDURE — 80048 BASIC METABOLIC PNL TOTAL CA: CPT | Performed by: INTERNAL MEDICINE

## 2024-11-07 PROCEDURE — A9537 TC99M MEBROFENIN: HCPCS | Performed by: RADIOLOGY

## 2024-11-07 PROCEDURE — 10002800 HB RX 250 W HCPCS: Performed by: RADIOLOGY

## 2024-11-07 PROCEDURE — G0378 HOSPITAL OBSERVATION PER HR: HCPCS

## 2024-11-07 PROCEDURE — 99221 1ST HOSP IP/OBS SF/LOW 40: CPT | Performed by: SURGERY

## 2024-11-07 PROCEDURE — 99223 1ST HOSP IP/OBS HIGH 75: CPT | Performed by: INTERNAL MEDICINE

## 2024-11-07 PROCEDURE — 78227 HEPATOBIL SYST IMAGE W/DRUG: CPT

## 2024-11-07 PROCEDURE — 10004651 HB RX, NO CHARGE ITEM: Performed by: INTERNAL MEDICINE

## 2024-11-07 PROCEDURE — 80076 HEPATIC FUNCTION PANEL: CPT | Performed by: INTERNAL MEDICINE

## 2024-11-07 PROCEDURE — 10006150 HB RX 343: Performed by: RADIOLOGY

## 2024-11-07 PROCEDURE — 76376 3D RENDER W/INTRP POSTPROCES: CPT | Performed by: INTERNAL MEDICINE

## 2024-11-07 PROCEDURE — 10002803 HB RX 637: Performed by: INTERNAL MEDICINE

## 2024-11-07 PROCEDURE — 93306 TTE W/DOPPLER COMPLETE: CPT | Performed by: INTERNAL MEDICINE

## 2024-11-07 PROCEDURE — 85025 COMPLETE CBC W/AUTO DIFF WBC: CPT | Performed by: INTERNAL MEDICINE

## 2024-11-07 RX ORDER — LEVETIRACETAM 750 MG/1
750 TABLET ORAL NIGHTLY
Status: DISCONTINUED | OUTPATIENT
Start: 2024-11-07 | End: 2024-11-07 | Stop reason: HOSPADM

## 2024-11-07 RX ORDER — HYDRALAZINE HYDROCHLORIDE 20 MG/ML
10 INJECTION INTRAMUSCULAR; INTRAVENOUS EVERY 4 HOURS PRN
Status: DISCONTINUED | OUTPATIENT
Start: 2024-11-07 | End: 2024-11-07 | Stop reason: HOSPADM

## 2024-11-07 RX ORDER — OXYCODONE HYDROCHLORIDE 5 MG/1
5 TABLET ORAL EVERY 6 HOURS PRN
COMMUNITY
Start: 2024-11-04

## 2024-11-07 RX ORDER — ONDANSETRON 4 MG/1
4 TABLET, ORALLY DISINTEGRATING ORAL EVERY 8 HOURS PRN
COMMUNITY
Start: 2024-10-23

## 2024-11-07 RX ORDER — SINCALIDE 5 UG/5ML
0.02 INJECTION, POWDER, LYOPHILIZED, FOR SOLUTION INTRAVENOUS ONCE
Status: COMPLETED | OUTPATIENT
Start: 2024-11-07 | End: 2024-11-07

## 2024-11-07 RX ORDER — ACETAMINOPHEN 500 MG
1000 TABLET ORAL EVERY 6 HOURS PRN
COMMUNITY
Start: 2024-11-04

## 2024-11-07 RX ORDER — IBUPROFEN 600 MG/1
600 TABLET, FILM COATED ORAL 3 TIMES DAILY
Qty: 200 TABLET | Refills: 0 | Status: SHIPPED | OUTPATIENT
Start: 2024-11-07

## 2024-11-07 RX ORDER — LEVETIRACETAM 500 MG/1
500 TABLET, FILM COATED, EXTENDED RELEASE ORAL DAILY
COMMUNITY
Start: 2024-10-30

## 2024-11-07 RX ORDER — LEVETIRACETAM 750 MG/1
750 TABLET, FILM COATED, EXTENDED RELEASE ORAL NIGHTLY
COMMUNITY
Start: 2024-10-30

## 2024-11-07 RX ORDER — IBUPROFEN 600 MG/1
600 TABLET, FILM COATED ORAL EVERY 6 HOURS PRN
Status: ON HOLD | COMMUNITY
Start: 2024-11-04 | End: 2024-11-07 | Stop reason: HOSPADM

## 2024-11-07 RX ORDER — LEVETIRACETAM 500 MG/1
500 TABLET ORAL DAILY
Status: DISCONTINUED | OUTPATIENT
Start: 2024-11-07 | End: 2024-11-07 | Stop reason: HOSPADM

## 2024-11-07 RX ORDER — KIT FOR THE PREPARATION OF TECHNETIUM TC 99M MEBROFENIN 45 MG/10ML
5 INJECTION, POWDER, LYOPHILIZED, FOR SOLUTION INTRAVENOUS ONCE
Status: COMPLETED | OUTPATIENT
Start: 2024-11-07 | End: 2024-11-07

## 2024-11-07 RX ORDER — LEVETIRACETAM 750 MG/1
750 TABLET ORAL NIGHTLY
Status: DISCONTINUED | OUTPATIENT
Start: 2024-11-07 | End: 2024-11-07

## 2024-11-07 RX ORDER — ACETAMINOPHEN 500 MG
1000 TABLET ORAL EVERY 6 HOURS
Status: DISCONTINUED | OUTPATIENT
Start: 2024-11-07 | End: 2024-11-07 | Stop reason: HOSPADM

## 2024-11-07 RX ORDER — PANTOPRAZOLE SODIUM 40 MG/1
40 TABLET, DELAYED RELEASE ORAL DAILY
Qty: 30 TABLET | Refills: 0 | Status: SHIPPED | OUTPATIENT
Start: 2024-11-07 | End: 2024-12-07

## 2024-11-07 RX ORDER — PSEUDOEPHEDRINE HCL 30 MG
100 TABLET ORAL 2 TIMES DAILY PRN
COMMUNITY
Start: 2024-11-04

## 2024-11-07 RX ORDER — ACETAMINOPHEN 325 MG/1
650 TABLET ORAL EVERY 6 HOURS PRN
Status: DISCONTINUED | OUTPATIENT
Start: 2024-11-07 | End: 2024-11-07 | Stop reason: HOSPADM

## 2024-11-07 RX ADMIN — SODIUM CHLORIDE: 9 INJECTION, SOLUTION INTRAVENOUS at 08:55

## 2024-11-07 RX ADMIN — MEBROFENIN 5 MILLICURIE: 45 INJECTION, POWDER, LYOPHILIZED, FOR SOLUTION INTRAVENOUS at 13:30

## 2024-11-07 RX ADMIN — SINCALIDE 1.25 MCG: 5 INJECTION, POWDER, LYOPHILIZED, FOR SOLUTION INTRAVENOUS at 12:40

## 2024-11-07 RX ADMIN — PIPERACILLIN SODIUM AND TAZOBACTAM SODIUM 3.38 G: 3; .375 INJECTION, POWDER, FOR SOLUTION INTRAVENOUS at 08:55

## 2024-11-07 RX ADMIN — PIPERACILLIN SODIUM AND TAZOBACTAM SODIUM 3.38 G: 3; .375 INJECTION, POWDER, FOR SOLUTION INTRAVENOUS at 13:33

## 2024-11-07 RX ADMIN — ACETAMINOPHEN 1000 MG: 500 TABLET ORAL at 06:14

## 2024-11-07 RX ADMIN — ACETAMINOPHEN 1000 MG: 500 TABLET ORAL at 13:33

## 2024-11-07 RX ADMIN — LEVETIRACETAM 500 MG: 500 TABLET, FILM COATED ORAL at 08:55

## 2024-11-07 RX ADMIN — SINCALIDE 1.25 MCG: 5 INJECTION, POWDER, LYOPHILIZED, FOR SOLUTION INTRAVENOUS at 14:40

## 2024-11-07 SDOH — SOCIAL STABILITY: SOCIAL NETWORK
HOW OFTEN DO YOU SEE OR TALK TO PEOPLE THAT YOU CARE ABOUT AND FEEL CLOSE TO? (FOR EXAMPLE: TALKING TO FRIENDS ON THE PHONE, VISITING FRIENDS OR FAMILY, GOING TO CHURCH OR CLUB MEETINGS): 5 OR MORE TIMES A WEEK

## 2024-11-07 SDOH — ECONOMIC STABILITY: HOUSING INSECURITY: DO YOU HAVE PROBLEMS WITH ANY OF THE FOLLOWING?: NONE OF THE ABOVE

## 2024-11-07 SDOH — ECONOMIC STABILITY: TRANSPORTATION INSECURITY
IN THE PAST 12 MONTHS, HAS LACK OF RELIABLE TRANSPORTATION KEPT YOU FROM MEDICAL APPOINTMENTS, MEETINGS, WORK OR FROM GETTING THINGS NEEDED FOR DAILY LIVING?: NO

## 2024-11-07 SDOH — ECONOMIC STABILITY: GENERAL: WOULD YOU LIKE HELP WITH ANY OF THE FOLLOWING NEEDS?: I DON'T WANT HELP WITH ANY OF THESE

## 2024-11-07 SDOH — ECONOMIC STABILITY: INCOME INSECURITY: IN THE PAST 12 MONTHS, HAS THE ELECTRIC, GAS, OIL, OR WATER COMPANY THREATENED TO SHUT OFF SERVICE IN YOUR HOME?: NO

## 2024-11-07 SDOH — ECONOMIC STABILITY: FOOD INSECURITY: WITHIN THE PAST 12 MONTHS, THE FOOD YOU BOUGHT JUST DIDN'T LAST AND YOU DIDN'T HAVE MONEY TO GET MORE.: NEVER TRUE

## 2024-11-07 SDOH — ECONOMIC STABILITY: HOUSING INSECURITY: WHAT IS YOUR LIVING SITUATION TODAY?: SIGNIFICANT OTHER;CHILDREN

## 2024-11-07 SDOH — ECONOMIC STABILITY: HOUSING INSECURITY: WHAT IS YOUR LIVING SITUATION TODAY?: I HAVE A STEADY PLACE TO LIVE

## 2024-11-07 SDOH — HEALTH STABILITY: PHYSICAL HEALTH: DO YOU HAVE DIFFICULTY DRESSING OR BATHING?: NO

## 2024-11-07 SDOH — HEALTH STABILITY: GENERAL
BECAUSE OF A PHYSICAL, MENTAL, OR EMOTIONAL CONDITION, DO YOU HAVE SERIOUS DIFFICULTY CONCENTRATING, REMEMBERING OR MAKING DECISIONS?: NO

## 2024-11-07 SDOH — HEALTH STABILITY: GENERAL: BECAUSE OF A PHYSICAL, MENTAL, OR EMOTIONAL CONDITION, DO YOU HAVE DIFFICULTY DOING ERRANDS ALONE?: NO

## 2024-11-07 SDOH — ECONOMIC STABILITY: GENERAL

## 2024-11-07 SDOH — SOCIAL STABILITY: SOCIAL NETWORK: SUPPORT SYSTEMS: SIGNIFICANT OTHER

## 2024-11-07 SDOH — ECONOMIC STABILITY: HOUSING INSECURITY: WHAT IS YOUR LIVING SITUATION TODAY?: HOUSE

## 2024-11-07 SDOH — HEALTH STABILITY: PHYSICAL HEALTH: DO YOU HAVE SERIOUS DIFFICULTY WALKING OR CLIMBING STAIRS?: NO

## 2024-11-07 ASSESSMENT — PATIENT HEALTH QUESTIONNAIRE - PHQ9
IS PATIENT ABLE TO COMPLETE PHQ2 OR PHQ9: NO, DEFER TO LATER TIME
IS PATIENT ABLE TO COMPLETE PHQ2 OR PHQ9: YES
SUM OF ALL RESPONSES TO PHQ9 QUESTIONS 1 AND 2: 0
CLINICAL INTERPRETATION OF PHQ2 SCORE: NO FURTHER SCREENING NEEDED

## 2024-11-07 ASSESSMENT — COLUMBIA-SUICIDE SEVERITY RATING SCALE - C-SSRS: IS THE PATIENT ABLE TO COMPLETE C-SSRS: NO, DEFER TO LATER TIME

## 2024-11-07 ASSESSMENT — LIFESTYLE VARIABLES
ALCOHOL_USE_STATUS: NO OR LOW RISK WITH VALIDATED TOOL
HOW OFTEN DO YOU HAVE 6 OR MORE DRINKS ON ONE OCCASION: NEVER
AUDIT-C TOTAL SCORE: 3
HOW MANY STANDARD DRINKS CONTAINING ALCOHOL DO YOU HAVE ON A TYPICAL DAY: 0,1 OR 2
HOW OFTEN DO YOU HAVE A DRINK CONTAINING ALCOHOL: 2 TO 3 TIMES PER WEEK

## 2024-11-07 ASSESSMENT — ACTIVITIES OF DAILY LIVING (ADL)
ADL_SHORT_OF_BREATH: NO
ADL_SCORE: 12
ADL_BEFORE_ADMISSION: INDEPENDENT
RECENT_DECLINE_ADL: NO

## 2024-11-07 ASSESSMENT — PAIN SCALES - GENERAL
PAINLEVEL_OUTOF10: 0
PAINLEVEL_OUTOF10: 0
PAINLEVEL_OUTOF10: 3
PAINLEVEL_OUTOF10: 0

## 2024-11-09 ENCOUNTER — TELEPHONE (OUTPATIENT)
Dept: CARE COORDINATION | Age: 39
End: 2024-11-09

## 2024-11-10 ENCOUNTER — TELEPHONE (OUTPATIENT)
Dept: CARE COORDINATION | Age: 39
End: 2024-11-10

## 2024-11-16 ENCOUNTER — TELEPHONE (OUTPATIENT)
Dept: CARE COORDINATION | Age: 39
End: 2024-11-16

## 2024-11-17 ENCOUNTER — TELEPHONE (OUTPATIENT)
Dept: CARE COORDINATION | Age: 39
End: 2024-11-17

## 2024-11-23 ENCOUNTER — TELEPHONE (OUTPATIENT)
Dept: CARE COORDINATION | Age: 39
End: 2024-11-23

## 2024-11-24 ENCOUNTER — TELEPHONE (OUTPATIENT)
Dept: CARE COORDINATION | Age: 39
End: 2024-11-24

## 2024-11-30 ENCOUNTER — TELEPHONE (OUTPATIENT)
Dept: CARE COORDINATION | Age: 39
End: 2024-11-30

## 2024-12-01 ENCOUNTER — TELEPHONE (OUTPATIENT)
Dept: CARE COORDINATION | Age: 39
End: 2024-12-01

## 2024-12-07 ENCOUNTER — TELEPHONE (OUTPATIENT)
Dept: CARE COORDINATION | Age: 39
End: 2024-12-07

## 2024-12-08 ENCOUNTER — TELEPHONE (OUTPATIENT)
Dept: CARE COORDINATION | Age: 39
End: 2024-12-08